# Patient Record
Sex: MALE | Race: WHITE | Employment: OTHER
[De-identification: names, ages, dates, MRNs, and addresses within clinical notes are randomized per-mention and may not be internally consistent; named-entity substitution may affect disease eponyms.]

---

## 2017-02-16 ENCOUNTER — OFFICE VISIT (OUTPATIENT)
Dept: INTERNAL MEDICINE | Facility: CLINIC | Age: 82
End: 2017-02-16

## 2017-02-16 VITALS
BODY MASS INDEX: 27.43 KG/M2 | WEIGHT: 181 LBS | OXYGEN SATURATION: 96 % | SYSTOLIC BLOOD PRESSURE: 112 MMHG | DIASTOLIC BLOOD PRESSURE: 70 MMHG | HEIGHT: 68 IN | HEART RATE: 70 BPM

## 2017-02-16 DIAGNOSIS — I10 ESSENTIAL HYPERTENSION: ICD-10-CM

## 2017-02-16 DIAGNOSIS — Z23 NEED FOR PROPHYLACTIC VACCINATION AGAINST STREPTOCOCCUS PNEUMONIAE (PNEUMOCOCCUS) AND INFLUENZA: ICD-10-CM

## 2017-02-16 DIAGNOSIS — I25.10 CORONARY ARTERY DISEASE INVOLVING NATIVE HEART WITHOUT ANGINA PECTORIS, UNSPECIFIED VESSEL OR LESION TYPE: Primary | ICD-10-CM

## 2017-02-16 DIAGNOSIS — Z12.5 ENCOUNTER FOR SCREENING FOR MALIGNANT NEOPLASM OF PROSTATE: ICD-10-CM

## 2017-02-16 DIAGNOSIS — Z23 NEED FOR PROPHYLACTIC VACCINATION AND INOCULATION AGAINST INFLUENZA: ICD-10-CM

## 2017-02-16 DIAGNOSIS — D64.9 ANEMIA, UNSPECIFIED TYPE: ICD-10-CM

## 2017-02-16 DIAGNOSIS — R79.9 ABNORMAL FINDING OF BLOOD CHEMISTRY: ICD-10-CM

## 2017-02-16 DIAGNOSIS — Z00.00 HEALTHCARE MAINTENANCE: ICD-10-CM

## 2017-02-16 PROCEDURE — 1123F ACP DISCUSS/DSCN MKR DOCD: CPT | Performed by: INTERNAL MEDICINE

## 2017-02-16 PROCEDURE — 99203 OFFICE O/P NEW LOW 30 MIN: CPT | Performed by: INTERNAL MEDICINE

## 2017-02-16 PROCEDURE — G0008 ADMIN INFLUENZA VIRUS VAC: HCPCS | Performed by: INTERNAL MEDICINE

## 2017-02-16 PROCEDURE — 90670 PCV13 VACCINE IM: CPT | Performed by: INTERNAL MEDICINE

## 2017-02-16 PROCEDURE — G8598 ASA/ANTIPLAT THER USED: HCPCS | Performed by: INTERNAL MEDICINE

## 2017-02-16 PROCEDURE — 90686 IIV4 VACC NO PRSV 0.5 ML IM: CPT | Performed by: INTERNAL MEDICINE

## 2017-02-16 PROCEDURE — G0009 ADMIN PNEUMOCOCCAL VACCINE: HCPCS | Performed by: INTERNAL MEDICINE

## 2017-02-16 PROCEDURE — G8420 CALC BMI NORM PARAMETERS: HCPCS | Performed by: INTERNAL MEDICINE

## 2017-02-16 PROCEDURE — G8427 DOCREV CUR MEDS BY ELIG CLIN: HCPCS | Performed by: INTERNAL MEDICINE

## 2017-02-16 PROCEDURE — 4040F PNEUMOC VAC/ADMIN/RCVD: CPT | Performed by: INTERNAL MEDICINE

## 2017-02-16 PROCEDURE — 1036F TOBACCO NON-USER: CPT | Performed by: INTERNAL MEDICINE

## 2017-02-16 PROCEDURE — G8484 FLU IMMUNIZE NO ADMIN: HCPCS | Performed by: INTERNAL MEDICINE

## 2017-02-23 DIAGNOSIS — N17.9 AKI (ACUTE KIDNEY INJURY) (HCC): Primary | ICD-10-CM

## 2018-04-09 ENCOUNTER — OFFICE VISIT (OUTPATIENT)
Dept: INTERNAL MEDICINE | Age: 83
End: 2018-04-09
Payer: MEDICARE

## 2018-04-09 VITALS
HEART RATE: 83 BPM | HEIGHT: 68 IN | WEIGHT: 173 LBS | SYSTOLIC BLOOD PRESSURE: 119 MMHG | DIASTOLIC BLOOD PRESSURE: 79 MMHG | BODY MASS INDEX: 26.22 KG/M2 | OXYGEN SATURATION: 97 %

## 2018-04-09 DIAGNOSIS — I20.9 ANGINA PECTORIS (HCC): Primary | ICD-10-CM

## 2018-04-09 DIAGNOSIS — I27.9: ICD-10-CM

## 2018-04-09 DIAGNOSIS — Z86.79 HISTORY OF ANGINA: ICD-10-CM

## 2018-04-09 PROCEDURE — G0439 PPPS, SUBSEQ VISIT: HCPCS | Performed by: INTERNAL MEDICINE

## 2018-04-09 RX ORDER — RAMIPRIL 2.5 MG/1
2.5 CAPSULE ORAL DAILY
Qty: 30 CAPSULE | Refills: 3 | Status: SHIPPED | OUTPATIENT
Start: 2018-04-09 | End: 2019-04-15 | Stop reason: ALTCHOICE

## 2018-04-09 RX ORDER — CARVEDILOL 6.25 MG/1
6.25 TABLET ORAL 2 TIMES DAILY WITH MEALS
Qty: 60 TABLET | Refills: 3 | Status: SHIPPED | OUTPATIENT
Start: 2018-04-09 | End: 2019-04-15 | Stop reason: ALTCHOICE

## 2018-04-09 ASSESSMENT — LIFESTYLE VARIABLES: HOW OFTEN DO YOU HAVE A DRINK CONTAINING ALCOHOL: 0

## 2018-04-09 ASSESSMENT — ANXIETY QUESTIONNAIRES: GAD7 TOTAL SCORE: 0

## 2018-04-09 ASSESSMENT — PATIENT HEALTH QUESTIONNAIRE - PHQ9: SUM OF ALL RESPONSES TO PHQ QUESTIONS 1-9: 0

## 2019-04-15 ENCOUNTER — OFFICE VISIT (OUTPATIENT)
Dept: INTERNAL MEDICINE | Age: 84
End: 2019-04-15
Payer: MEDICARE

## 2019-04-15 VITALS
HEIGHT: 69 IN | WEIGHT: 165.4 LBS | HEART RATE: 69 BPM | SYSTOLIC BLOOD PRESSURE: 135 MMHG | BODY MASS INDEX: 24.5 KG/M2 | DIASTOLIC BLOOD PRESSURE: 70 MMHG

## 2019-04-15 DIAGNOSIS — I20.9 ANGINA PECTORIS (HCC): ICD-10-CM

## 2019-04-15 DIAGNOSIS — I42.8 NON-ISCHEMIC CARDIOMYOPATHY (HCC): ICD-10-CM

## 2019-04-15 PROCEDURE — 99213 OFFICE O/P EST LOW 20 MIN: CPT | Performed by: INTERNAL MEDICINE

## 2019-04-15 PROCEDURE — G8510 SCR DEP NEG, NO PLAN REQD: HCPCS | Performed by: INTERNAL MEDICINE

## 2019-04-15 PROCEDURE — 4040F PNEUMOC VAC/ADMIN/RCVD: CPT | Performed by: INTERNAL MEDICINE

## 2019-04-15 PROCEDURE — 99211 OFF/OP EST MAY X REQ PHY/QHP: CPT | Performed by: INTERNAL MEDICINE

## 2019-04-15 PROCEDURE — G8420 CALC BMI NORM PARAMETERS: HCPCS | Performed by: INTERNAL MEDICINE

## 2019-04-15 PROCEDURE — G8599 NO ASA/ANTIPLAT THER USE RNG: HCPCS | Performed by: INTERNAL MEDICINE

## 2019-04-15 PROCEDURE — G8427 DOCREV CUR MEDS BY ELIG CLIN: HCPCS | Performed by: INTERNAL MEDICINE

## 2019-04-15 PROCEDURE — 1036F TOBACCO NON-USER: CPT | Performed by: INTERNAL MEDICINE

## 2019-04-15 PROCEDURE — 1123F ACP DISCUSS/DSCN MKR DOCD: CPT | Performed by: INTERNAL MEDICINE

## 2019-04-15 PROCEDURE — 3288F FALL RISK ASSESSMENT DOCD: CPT | Performed by: INTERNAL MEDICINE

## 2019-04-15 ASSESSMENT — ENCOUNTER SYMPTOMS
GASTROINTESTINAL NEGATIVE: 1
RESPIRATORY NEGATIVE: 1
EYES NEGATIVE: 1

## 2019-04-15 ASSESSMENT — PATIENT HEALTH QUESTIONNAIRE - PHQ9
SUM OF ALL RESPONSES TO PHQ QUESTIONS 1-9: 1
SUM OF ALL RESPONSES TO PHQ QUESTIONS 1-9: 1
2. FEELING DOWN, DEPRESSED OR HOPELESS: 0
SUM OF ALL RESPONSES TO PHQ9 QUESTIONS 1 & 2: 1
1. LITTLE INTEREST OR PLEASURE IN DOING THINGS: 1

## 2019-04-15 NOTE — PATIENT INSTRUCTIONS
Patient to return to clinic 1 year (office will call and schedule an appointment). AVS reviewed and given to pt. It is very important for your care that you keep your appointment. If for some reason you are unable to keep your appointment it is equally important that you call our office at [phone] to cancel your appointment and reschedule. Failure to do so may result in your termination from our practice.   MB

## 2019-04-15 NOTE — PROGRESS NOTES
[facility]   Progress Note      Chronic Disease Visit Information    BP Readings from Last 3 Encounters:   04/15/19 135/70   04/09/18 119/79   02/16/17 112/70          Hemoglobin A1C (%)   Date Value   02/18/2017 5.1     LDL Cholesterol (mg/dL)   Date Value   02/18/2017 57     HDL (mg/dL)   Date Value   02/18/2017 32 (L)     BUN (mg/dL)   Date Value   03/06/2017 12     CREATININE (mg/dL)   Date Value   03/06/2017 1.06     Glucose (mg/dL)   Date Value   03/06/2017 90   12/12/2011 100            Have you changed or started any medications since your last visit including any over-the-counter medicines, vitamins, or herbal medicines? no   Are you having any side effects from any of your medications? -  no  Have you stopped taking any of your medications? Is so, why? -  no    Have you seen any other physician or provider since your last visit? No  Have you had any other diagnostic tests since your last visit? No  Have you been seen in the emergency room and/or had an admission to a hospital since we last saw you? No  Have you had your annual diabetic retinal (eye) exam? No  Have you had your routine dental cleaning in the past 6 months? no    Have you activated your [employer] account? If not, what are your barriers?  Yes     Patient Care Team:  Robinson Thapa MD as PCP - General (Internal Medicine)  Robinson Thapa MD as PCP - [facility] Attributed Provider         Medical History Review  Past Medical, Family, and Social History reviewed and does contribute to the patient presenting condition    Health Maintenance   Topic Date Due    DTaP/Tdap/Td vaccine (1 - Tdap) 12/03/1956    Shingles Vaccine (1 of 2) 12/03/1987    Potassium monitoring  03/06/2018    Creatinine monitoring  03/06/2018    Flu vaccine (Season Ended) 09/01/2019    Pneumococcal 65+ years Vaccine  Completed       Date of patient's visit: 4/15/2019  Patient's Name:  Shawnee Luther                   YOB: 1935        PCP:  William Araujo Pilar Mary MD    Cedrick Salter is a 82 y.o. male who presents for   Chief Complaint   Patient presents with    Annual Exam    Forms     assistant living   [facility] Maintenance     pt declined all vaccination    and follow up of chronic medical problems. Patient Active Problem List   Diagnosis    Recent retinal detachment, partial, with single defect    Macular hole    Hypertension    Arthritis    Psoriasis    Anemia    Positive cardiac stress test    CAD (coronary artery disease)    Non-ischemic cardiomyopathy (HCC)    Angina pectoris (HCC)    History of angina       HISTORY OF PRESENT ILLNESS:    History was obtained from the patient. Here for a routine visit. No acute issues. Dementia has worsened where around the clock care is needed and he will be transferring from home to [facility]. Daughter is POA and in room during visit. He has a living will and code status discussion had with what he had documented in living will, the decision to be [facility] was had. Patient's allergies, medications, past medical, surgical, social and family histories were reviewed and updated as appropriate. ALLERGIES    No Known Allergies      MEDICATIONS:      Current Outpatient Medications   Medication Sig Dispense Refill    ramipril (ALTACE) 2.5 MG capsule Take 1 capsule by mouth daily 30 capsule 3    carvedilol (COREG) 6.25 MG tablet Take 1 tablet by mouth 2 times daily (with meals) 60 tablet 3     No current facility-administered medications for this visit.         Patient Care Team:  Samuel Archer MD as PCP - General (Internal Medicine)  Samuel Archer MD as PCP - S Attributed Provider    PAST MEDICAL AND SURGICAL HISTORY:      Past Medical History:   Diagnosis Date    Arthritis     Hx of blood clots     l arm    Hypertension     off meds now    Psoriasis      Past Surgical History:   Procedure Laterality Date    CARDIAC CATHETERIZATION  03/10/2015    CATARACT REMOVAL WITH IMPLANT Bilateral     EYE SURGERY Left 5/6/14    vitrectomy    VENA CAVA FILTER PLACEMENT      catrachita filter    VITRECTOMY Left 10/14/14       SOCIAL HISTORY      Social History     Tobacco Use    Smoking status: Former Smoker    Smokeless tobacco: Former User    Tobacco comment: smoked pipe and cigars   quit 1 yr ago   Substance Use Topics    Alcohol use: No     Comment: quit     Cain Duarte  reports that he has quit smoking. He has quit using smokeless tobacco.    FAMILY HISTORY:      Family History   Problem Relation Age of Onset    Heart Disease Mother     Cancer Mother     Stroke Father        REVIEW OF SYSTEMS:    Review of Systems   Constitutional: Negative. HENT: Negative. Eyes: Negative. Respiratory: Negative. Cardiovascular: Negative. Gastrointestinal: Negative. Endocrine: Positive for cold intolerance. Genitourinary: Negative. Musculoskeletal: Negative. Skin: Negative. Neurological: Negative. Hematological: Negative. Psychiatric/Behavioral: Positive for confusion.        PHYSICAL EXAM:      Vitals:    04/15/19 1251   BP: 135/70   Pulse: 69     BP Readings from Last 3 Encounters:   04/15/19 135/70   04/09/18 119/79   02/16/17 112/70       Physical Examination: General appearance - normal appearing weight  Mental status - affect appropriate to mood  Chest - clear to auscultation, no wheezes, rales or rhonchi, symmetric air entry, diminished breath sounds noted at right base  Heart - normal rate and regular rhythm  Abdomen - soft, nontender, nondistended, no masses or organomegaly  Back exam - full range of motion, no tenderness, palpable spasm or pain on motion  Neurological - significant dementia, normal speech, no focal findings or movement disorder noted  Musculoskeletal - no joint tenderness, deformity or swelling  Extremities - no pedal edema noted    LABORATORY FINDINGS:    CBC:   Lab Results   Component Value Date    WBC 5.5 02/18/2017    HGB 15.4 02/18/2017     02/18/2017  12/12/2011     BMP:    Lab Results   Component Value Date     03/06/2017    K 4.8 03/06/2017     03/06/2017    CO2 23 03/06/2017    BUN 12 03/06/2017    CREATININE 1.06 03/06/2017    GLUCOSE 90 03/06/2017    GLUCOSE 100 12/12/2011     Hemoglobin A1C:   Lab Results   Component Value Date    LABA1C 5.1 02/18/2017     Microalbumin Urine: No results found for: Elmer Lam  Lipid profile:   Lab Results   Component Value Date    CHOL 128 02/18/2017    TRIG 193 02/18/2017    HDL 32 02/18/2017     Thyroid functions: No results found for: TSH   Hepatic functions:   Lab Results   Component Value Date    ALT 13 02/18/2017    AST 16 02/18/2017    PROT 7.1 02/18/2017    BILITOT 0.72 02/18/2017    BILIDIR 0.29 11/30/2013    LABALBU 4.3 02/18/2017     Urine Analysis: No results found for: [id number] [address]:      Health Maintenance Due   Topic Date Due    DTaP/Tdap/Td vaccine (1 - Tdap) 12/03/1956    Shingles Vaccine (1 of 2) 12/03/1987    Potassium monitoring  03/06/2018    Creatinine monitoring  03/06/2018       ASSESSMENT AND PLAN:       Diagnosis Orders   1. Non-ischemic cardiomyopathy ([address]     2. Angina pectoris ([address]       All stable. On no meds currently. Has some seasonal allergies that he takes OTC claritin for. [address] forms and living assistance forms completed  rtc in 1 year      FOLLOW UP:   1. кЕатерина Leonard received counseling on the following healthy behaviors: nutrition and exercise    2. Reviewed prior labs and health maintenance. 3.  Discussed use, benefit, and side effects of prescribed medications. Barriers to medication compliance addressed. All patient questions answered. Pt voiced understanding. 4.  Continue current medications, diet and exercise. No orders of the defined types were placed in this encounter. Completed Refills               Requested Prescriptions      No prescriptions requested or ordered in this encounter       5.  Patient given educational materials - see patient instructions    6. Was a self-tracking handout given in paper form or via [employer]? Yes  If yes, see orders or list here. No orders of the defined types were placed in this encounter. Return in about 1 year (around 4/15/2020). Patient voiced understanding and agreed to treatment plan. This note is created with the assistance of a speech-recognition program. While intending to generate a document that actually reflects the content of the visit, the document can still have some mistakes which may not have been identified and corrected by editing.     Dr Sadi Moreau MD, [address]  Associate , Department of Internal Medicine  [facility]  Attending [address] [facility], [facility] Susy AndradeScionHealthangel luis [address]                   4/15/2019, 1:18 PM

## 2019-10-21 ENCOUNTER — OFFICE VISIT (OUTPATIENT)
Dept: INTERNAL MEDICINE | Age: 84
End: 2019-10-21
Payer: MEDICARE

## 2019-10-21 VITALS
SYSTOLIC BLOOD PRESSURE: 122 MMHG | BODY MASS INDEX: 21.59 KG/M2 | WEIGHT: 150.8 LBS | DIASTOLIC BLOOD PRESSURE: 76 MMHG | HEIGHT: 70 IN

## 2019-10-21 DIAGNOSIS — Z23 NEEDS FLU SHOT: ICD-10-CM

## 2019-10-21 DIAGNOSIS — Z23 NEED FOR PROPHYLACTIC VACCINATION AND INOCULATION AGAINST VARICELLA: ICD-10-CM

## 2019-10-21 DIAGNOSIS — F03.90 DEMENTIA WITHOUT BEHAVIORAL DISTURBANCE, UNSPECIFIED DEMENTIA TYPE: Primary | ICD-10-CM

## 2019-10-21 DIAGNOSIS — Z23 NEED FOR PROPHYLACTIC VACCINATION AGAINST DIPHTHERIA-TETANUS-PERTUSSIS (DTP): ICD-10-CM

## 2019-10-21 DIAGNOSIS — I10 ESSENTIAL HYPERTENSION: ICD-10-CM

## 2019-10-21 DIAGNOSIS — I25.10 CORONARY ARTERY DISEASE INVOLVING NATIVE HEART WITHOUT ANGINA PECTORIS, UNSPECIFIED VESSEL OR LESION TYPE: ICD-10-CM

## 2019-10-21 PROCEDURE — 99214 OFFICE O/P EST MOD 30 MIN: CPT | Performed by: NURSE PRACTITIONER

## 2019-10-21 PROCEDURE — 4040F PNEUMOC VAC/ADMIN/RCVD: CPT | Performed by: NURSE PRACTITIONER

## 2019-10-21 PROCEDURE — G8420 CALC BMI NORM PARAMETERS: HCPCS | Performed by: NURSE PRACTITIONER

## 2019-10-21 PROCEDURE — 90688 IIV4 VACCINE SPLT 0.5 ML IM: CPT | Performed by: NURSE PRACTITIONER

## 2019-10-21 PROCEDURE — 99211 OFF/OP EST MAY X REQ PHY/QHP: CPT | Performed by: NURSE PRACTITIONER

## 2019-10-21 PROCEDURE — G8599 NO ASA/ANTIPLAT THER USE RNG: HCPCS | Performed by: NURSE PRACTITIONER

## 2019-10-21 PROCEDURE — G8427 DOCREV CUR MEDS BY ELIG CLIN: HCPCS | Performed by: NURSE PRACTITIONER

## 2019-10-21 PROCEDURE — 1036F TOBACCO NON-USER: CPT | Performed by: NURSE PRACTITIONER

## 2019-10-21 PROCEDURE — 1123F ACP DISCUSS/DSCN MKR DOCD: CPT | Performed by: NURSE PRACTITIONER

## 2019-10-21 PROCEDURE — G8482 FLU IMMUNIZE ORDER/ADMIN: HCPCS | Performed by: NURSE PRACTITIONER

## 2019-10-21 RX ORDER — GUAIFENESIN 100 MG/5ML
400 SYRUP ORAL 4 TIMES DAILY PRN
Qty: 1 BOTTLE | Refills: 0 | Status: SHIPPED | OUTPATIENT
Start: 2019-10-21

## 2019-10-21 RX ORDER — ACETAMINOPHEN 500 MG
500 TABLET ORAL 4 TIMES DAILY PRN
Qty: 60 TABLET | Refills: 0 | Status: SHIPPED | OUTPATIENT
Start: 2019-10-21

## 2019-11-12 ASSESSMENT — ENCOUNTER SYMPTOMS
SHORTNESS OF BREATH: 0
BLURRED VISION: 0
ORTHOPNEA: 0
CHEST TIGHTNESS: 0

## 2020-01-17 ENCOUNTER — NURSE TRIAGE (OUTPATIENT)
Dept: OTHER | Facility: CLINIC | Age: 85
End: 2020-01-17

## 2020-01-18 ENCOUNTER — APPOINTMENT (OUTPATIENT)
Dept: CT IMAGING | Age: 85
DRG: 640 | End: 2020-01-18
Payer: MEDICARE

## 2020-01-18 ENCOUNTER — APPOINTMENT (OUTPATIENT)
Dept: GENERAL RADIOLOGY | Age: 85
DRG: 640 | End: 2020-01-18
Payer: MEDICARE

## 2020-01-18 ENCOUNTER — HOSPITAL ENCOUNTER (INPATIENT)
Age: 85
LOS: 3 days | Discharge: HOME | DRG: 640 | End: 2020-01-21
Attending: EMERGENCY MEDICINE | Admitting: INTERNAL MEDICINE
Payer: MEDICARE

## 2020-01-18 PROBLEM — F01.50 VASCULAR DEMENTIA (HCC): Status: ACTIVE | Noted: 2020-01-18

## 2020-01-18 PROBLEM — G93.41 ACUTE METABOLIC ENCEPHALOPATHY: Status: ACTIVE | Noted: 2020-01-18

## 2020-01-18 PROBLEM — R53.1 WEAKNESS: Status: ACTIVE | Noted: 2020-01-18

## 2020-01-18 LAB
-: NORMAL
ABSOLUTE EOS #: 0.04 K/UL (ref 0–0.4)
ABSOLUTE IMMATURE GRANULOCYTE: 0 K/UL (ref 0–0.3)
ABSOLUTE LYMPH #: 0.77 K/UL (ref 1–4.8)
ABSOLUTE MONO #: 0.65 K/UL (ref 0.1–0.8)
ALBUMIN SERPL-MCNC: 4.1 G/DL (ref 3.5–5.2)
ALBUMIN/GLOBULIN RATIO: 1.6 (ref 1–2.5)
ALP BLD-CCNC: 66 U/L (ref 40–129)
ALT SERPL-CCNC: 25 U/L (ref 5–41)
AMMONIA: 42 UMOL/L (ref 16–60)
ANION GAP SERPL CALCULATED.3IONS-SCNC: 12 MMOL/L (ref 9–17)
AST SERPL-CCNC: 31 U/L
BASOPHILS # BLD: 0 % (ref 0–2)
BASOPHILS ABSOLUTE: 0 K/UL (ref 0–0.2)
BILIRUB SERPL-MCNC: 0.63 MG/DL (ref 0.3–1.2)
BILIRUBIN URINE: NEGATIVE
BNP INTERPRETATION: NORMAL
BUN BLDV-MCNC: 21 MG/DL (ref 8–23)
BUN/CREAT BLD: NORMAL (ref 9–20)
CALCIUM SERPL-MCNC: 9.2 MG/DL (ref 8.6–10.4)
CHLORIDE BLD-SCNC: 103 MMOL/L (ref 98–107)
CHP ED QC CHECK: YES
CO2: 28 MMOL/L (ref 20–31)
COLOR: YELLOW
COMMENT UA: ABNORMAL
CREAT SERPL-MCNC: 0.8 MG/DL (ref 0.7–1.2)
DIFFERENTIAL TYPE: ABNORMAL
EOSINOPHILS RELATIVE PERCENT: 1 % (ref 1–4)
GFR AFRICAN AMERICAN: >60 ML/MIN
GFR NON-AFRICAN AMERICAN: >60 ML/MIN
GFR SERPL CREATININE-BSD FRML MDRD: NORMAL ML/MIN/{1.73_M2}
GFR SERPL CREATININE-BSD FRML MDRD: NORMAL ML/MIN/{1.73_M2}
GLUCOSE BLD-MCNC: 86 MG/DL (ref 75–110)
GLUCOSE BLD-MCNC: 87 MG/DL
GLUCOSE BLD-MCNC: 90 MG/DL (ref 70–99)
GLUCOSE URINE: NEGATIVE
HCT VFR BLD CALC: 46.7 % (ref 40.7–50.3)
HEMOGLOBIN: 15.1 G/DL (ref 13–17)
IMMATURE GRANULOCYTES: 0 %
KETONES, URINE: ABNORMAL
LEUKOCYTE ESTERASE, URINE: NEGATIVE
LYMPHOCYTES # BLD: 18 % (ref 24–44)
MCH RBC QN AUTO: 30.7 PG (ref 25.2–33.5)
MCHC RBC AUTO-ENTMCNC: 32.3 G/DL (ref 28.4–34.8)
MCV RBC AUTO: 94.9 FL (ref 82.6–102.9)
MONOCYTES # BLD: 15 % (ref 1–7)
MORPHOLOGY: NORMAL
NITRITE, URINE: NEGATIVE
NRBC AUTOMATED: 0 PER 100 WBC
PDW BLD-RTO: 13.4 % (ref 11.8–14.4)
PH UA: 5 (ref 5–8)
PLATELET # BLD: 173 K/UL (ref 138–453)
PLATELET ESTIMATE: ABNORMAL
PMV BLD AUTO: 9.5 FL (ref 8.1–13.5)
POTASSIUM SERPL-SCNC: 4.3 MMOL/L (ref 3.7–5.3)
PRO-BNP: 193 PG/ML
PROTEIN UA: NEGATIVE
RBC # BLD: 4.92 M/UL (ref 4.21–5.77)
RBC # BLD: ABNORMAL 10*6/UL
REASON FOR REJECTION: NORMAL
SEG NEUTROPHILS: 66 % (ref 36–66)
SEGMENTED NEUTROPHILS ABSOLUTE COUNT: 2.84 K/UL (ref 1.8–7.7)
SODIUM BLD-SCNC: 143 MMOL/L (ref 135–144)
SPECIFIC GRAVITY UA: 1.02 (ref 1–1.03)
TOTAL PROTEIN: 6.6 G/DL (ref 6.4–8.3)
TROPONIN INTERP: ABNORMAL
TROPONIN T: ABNORMAL NG/ML
TROPONIN, HIGH SENSITIVITY: 100 NG/L (ref 0–22)
TROPONIN, HIGH SENSITIVITY: 113 NG/L (ref 0–22)
TROPONIN, HIGH SENSITIVITY: 93 NG/L (ref 0–22)
TROPONIN, HIGH SENSITIVITY: 98 NG/L (ref 0–22)
TSH SERPL DL<=0.05 MIU/L-ACNC: 2.35 MIU/L (ref 0.3–5)
TURBIDITY: CLEAR
URINE HGB: NEGATIVE
UROBILINOGEN, URINE: NORMAL
WBC # BLD: 4.3 K/UL (ref 3.5–11.3)
WBC # BLD: ABNORMAL 10*3/UL
ZZ NTE CLEAN UP: ORDERED TEST: NORMAL
ZZ NTE WITH NAME CLEAN UP: SPECIMEN SOURCE: NORMAL

## 2020-01-18 PROCEDURE — 36415 COLL VENOUS BLD VENIPUNCTURE: CPT

## 2020-01-18 PROCEDURE — 83880 ASSAY OF NATRIURETIC PEPTIDE: CPT

## 2020-01-18 PROCEDURE — 2580000003 HC RX 258: Performed by: STUDENT IN AN ORGANIZED HEALTH CARE EDUCATION/TRAINING PROGRAM

## 2020-01-18 PROCEDURE — 82607 VITAMIN B-12: CPT

## 2020-01-18 PROCEDURE — 80053 COMPREHEN METABOLIC PANEL: CPT

## 2020-01-18 PROCEDURE — 6370000000 HC RX 637 (ALT 250 FOR IP): Performed by: STUDENT IN AN ORGANIZED HEALTH CARE EDUCATION/TRAINING PROGRAM

## 2020-01-18 PROCEDURE — 82306 VITAMIN D 25 HYDROXY: CPT

## 2020-01-18 PROCEDURE — 85025 COMPLETE CBC W/AUTO DIFF WBC: CPT

## 2020-01-18 PROCEDURE — 71045 X-RAY EXAM CHEST 1 VIEW: CPT

## 2020-01-18 PROCEDURE — 84425 ASSAY OF VITAMIN B-1: CPT

## 2020-01-18 PROCEDURE — 73030 X-RAY EXAM OF SHOULDER: CPT

## 2020-01-18 PROCEDURE — 1200000000 HC SEMI PRIVATE

## 2020-01-18 PROCEDURE — 82746 ASSAY OF FOLIC ACID SERUM: CPT

## 2020-01-18 PROCEDURE — 84443 ASSAY THYROID STIM HORMONE: CPT

## 2020-01-18 PROCEDURE — 6360000002 HC RX W HCPCS: Performed by: STUDENT IN AN ORGANIZED HEALTH CARE EDUCATION/TRAINING PROGRAM

## 2020-01-18 PROCEDURE — 80076 HEPATIC FUNCTION PANEL: CPT

## 2020-01-18 PROCEDURE — 82140 ASSAY OF AMMONIA: CPT

## 2020-01-18 PROCEDURE — 81003 URINALYSIS AUTO W/O SCOPE: CPT

## 2020-01-18 PROCEDURE — 84484 ASSAY OF TROPONIN QUANT: CPT

## 2020-01-18 PROCEDURE — 70450 CT HEAD/BRAIN W/O DYE: CPT

## 2020-01-18 PROCEDURE — 80307 DRUG TEST PRSMV CHEM ANLYZR: CPT

## 2020-01-18 PROCEDURE — 93005 ELECTROCARDIOGRAM TRACING: CPT | Performed by: STUDENT IN AN ORGANIZED HEALTH CARE EDUCATION/TRAINING PROGRAM

## 2020-01-18 PROCEDURE — 82947 ASSAY GLUCOSE BLOOD QUANT: CPT

## 2020-01-18 PROCEDURE — 99285 EMERGENCY DEPT VISIT HI MDM: CPT

## 2020-01-18 RX ORDER — SODIUM CHLORIDE 0.9 % (FLUSH) 0.9 %
10 SYRINGE (ML) INJECTION PRN
Status: DISCONTINUED | OUTPATIENT
Start: 2020-01-18 | End: 2020-01-21 | Stop reason: HOSPADM

## 2020-01-18 RX ORDER — SODIUM CHLORIDE 0.9 % (FLUSH) 0.9 %
10 SYRINGE (ML) INJECTION EVERY 12 HOURS SCHEDULED
Status: DISCONTINUED | OUTPATIENT
Start: 2020-01-18 | End: 2020-01-21 | Stop reason: HOSPADM

## 2020-01-18 RX ORDER — SODIUM CHLORIDE 9 MG/ML
INJECTION, SOLUTION INTRAVENOUS CONTINUOUS
Status: DISCONTINUED | OUTPATIENT
Start: 2020-01-18 | End: 2020-01-18

## 2020-01-18 RX ORDER — ASPIRIN 81 MG/1
324 TABLET, CHEWABLE ORAL ONCE
Status: COMPLETED | OUTPATIENT
Start: 2020-01-18 | End: 2020-01-18

## 2020-01-18 RX ORDER — DEXTROSE AND SODIUM CHLORIDE 5; .9 G/100ML; G/100ML
INJECTION, SOLUTION INTRAVENOUS CONTINUOUS
Status: DISCONTINUED | OUTPATIENT
Start: 2020-01-18 | End: 2020-01-19

## 2020-01-18 RX ORDER — ACETAMINOPHEN 325 MG/1
650 TABLET ORAL EVERY 4 HOURS PRN
Status: DISCONTINUED | OUTPATIENT
Start: 2020-01-18 | End: 2020-01-21 | Stop reason: HOSPADM

## 2020-01-18 RX ORDER — ONDANSETRON 2 MG/ML
4 INJECTION INTRAMUSCULAR; INTRAVENOUS EVERY 6 HOURS PRN
Status: DISCONTINUED | OUTPATIENT
Start: 2020-01-18 | End: 2020-01-21 | Stop reason: HOSPADM

## 2020-01-18 RX ORDER — DIPHENHYDRAMINE HYDROCHLORIDE 50 MG/ML
25 INJECTION INTRAMUSCULAR; INTRAVENOUS ONCE
Status: COMPLETED | OUTPATIENT
Start: 2020-01-18 | End: 2020-01-18

## 2020-01-18 RX ADMIN — DEXTROSE AND SODIUM CHLORIDE: 5; 900 INJECTION, SOLUTION INTRAVENOUS at 20:57

## 2020-01-18 RX ADMIN — ASPIRIN 324 MG: 81 TABLET ORAL at 15:40

## 2020-01-18 RX ADMIN — DIPHENHYDRAMINE HYDROCHLORIDE 25 MG: 50 INJECTION, SOLUTION INTRAMUSCULAR; INTRAVENOUS at 22:24

## 2020-01-18 RX ADMIN — ENOXAPARIN SODIUM 40 MG: 40 INJECTION SUBCUTANEOUS at 20:57

## 2020-01-18 ASSESSMENT — PAIN SCALES - GENERAL: PAINLEVEL_OUTOF10: 0

## 2020-01-18 ASSESSMENT — ENCOUNTER SYMPTOMS
BACK PAIN: 0
EYE REDNESS: 0
NAUSEA: 0
WHEEZING: 0
EYE DISCHARGE: 0
SHORTNESS OF BREATH: 0
BLOOD IN STOOL: 0
FACIAL SWELLING: 0
EYE ITCHING: 0
CONSTIPATION: 0
ABDOMINAL PAIN: 0
DIARRHEA: 0
COLOR CHANGE: 0
APNEA: 0
RHINORRHEA: 0
CHEST TIGHTNESS: 0
COUGH: 0
VOMITING: 0
ABDOMINAL DISTENTION: 0

## 2020-01-18 NOTE — ED NOTES
Pt to ED via EMS from assisted living facility with c/o possible facial droop per aid at facility, leaning to the left and decrease in ability to walk and do his ADLs. Pt has hx of dementia, normally can do all of his ADLs on his own, gets around without any problems. Today the patient couldn't get up on his own, still leaning to the left. The patient's speech is clear, he is alert and disoriented which is his baseline. Pt placed on full cardiac monitor, placed in gown for exam.  Dr Jose Hodges at bedside as well as the patient's daughter and son-in-law.      Suri Clements, RN  01/18/20 5667

## 2020-01-18 NOTE — ED PROVIDER NOTES
[facility] ED  Emergency Department Encounter  Emergency Medicine Resident     Pt Name: Marylen Font  MRN: [id number]  [place] 1935  Date of evaluation: 1/18/20  PCP:  Michele Schrader MD    [address] [place] [address]       Chief Complaint   Patient presents with    Fatigue     Pt to ED room 15 with c/o weakness per daughter past 3 days. Daughter told EMS he had a facial droop this am. EMS denies any neuro deficits. HISTORY OFPRESENT ILLNESS  (Location/Symptom, Timing/Onset, Context/Setting, Quality, Duration, Modifying Factors,Severity.)      Marylen Font is a 82 y.o. male who presents with complaints of generalized fatigue and weakness per daughter. Patient with past medical history of hypertension, CAD arthritis patient lives at a facility, where he can perform all activities of daily living at his baseline. Daughter states that patient usually is up walking around, interactive and that he has been laying around, more fatigued than normal.  There was a report that he was found at one point slid down on the wall, he was evaluated by the staff there there was no signs of trauma, is not on blood thinners and therefore was not evaluated in the ED. Patient denies any chest pain, shortness of breath, abdominal pain. Daughter notes that they noticed that patient's bilateral legs were slightly more swollen than normal with no signs of redness, warmth or fever. Patient has continued to eat and drink, have normal bowel movement. Patient denies headache, vision changes, neck pain, back pain. Patient denies falls, hitting his head or loss of consciousness although patient does have a history of dementia therefore making history limited. PAST MEDICAL / SURGICAL / SOCIAL / FAMILY HISTORY      has a past medical history of Alzheimer disease ([address] Arthritis, Dementia ([address] Hx of blood clots, Hypertension, and Psoriasis.      has a past surgical history that includes eye surgery (Left, 5/6/14); Vena Cava Filter Placement; Cataract removal with implant (Bilateral); vitrectomy (Left, 10/14/14); and Cardiac catheterization (03/10/2015). Social History     Socioeconomic History    Marital status:      Spouse name: Not on file    Number of children: Not on file    Years of education: Not on file    Highest education level: Not on file   Occupational History    Not on file   Social Needs    Financial resource strain: Not on file    Food insecurity:     Worry: Not on file     Inability: Not on file    Transportation needs:     Medical: Not on file     Non-medical: Not on file   Tobacco Use    Smoking status: Former Smoker    Smokeless tobacco: Former User    Tobacco comment: smoked pipe and cigars   quit 1 yr ago   Substance and Sexual Activity    Alcohol use: No     Comment: quit    Drug use: No    Sexual activity: Not on file   Lifestyle    Physical activity:     Days per week: Not on file     Minutes per session: Not on file    Stress: Not on file   Relationships    Social connections:     Talks on phone: Not on file     Gets together: Not on file     Attends [employer] service: Not on file     Active member of club or organization: Not on file     Attends meetings of clubs or organizations: Not on file     Relationship status: Not on file    Intimate partner violence:     Fear of current or ex partner: Not on file     Emotionally abused: Not on file     Physically abused: Not on file     Forced sexual activity: Not on file   Other Topics Concern    Not on file   Social History Narrative    Not on file       Family History   Problem Relation Age of Onset    Heart Disease Mother     Cancer Mother     Stroke Father         Allergies:  Patient has no known allergies. Home Medications:  Prior to Admission medications    Medication Sig Start Date End Date Taking?  Authorizing Provider   acetaminophen (TYLENOL) 500 MG tablet Take 1 tablet by mouth 4 times daily as needed for Pain or Fever 10/21/19  Yes JENNA Rodriguez CNP   guaiFENesin (ALTARUSSIN) 100 MG/5ML syrup Take 20 mLs by mouth 4 times daily as needed for Cough 10/21/19   JENNA Rodriguez CNP       REVIEW OFSYSTEMS    (2-9 systems for level 4, 10 or more for level 5)      Review of Systems   Constitutional: Positive for fatigue. Negative for activity change, appetite change, chills and fever. Eyes: Negative for visual disturbance. Respiratory: Negative for cough, shortness of breath and wheezing. Cardiovascular: Positive for leg swelling. Negative for chest pain and palpitations. Gastrointestinal: Negative for abdominal pain, constipation, diarrhea, nausea and vomiting. Genitourinary: Negative for dysuria, frequency and hematuria. Musculoskeletal: Negative for back pain and neck pain. Skin: Negative for rash and wound. Neurological: Positive for weakness (Generalized). Negative for dizziness, syncope, light-headedness and headaches. PHYSICAL EXAM   (up to 7 for level 4, 8 or more forlevel 5)      INITIAL VITALS:   ED Triage Vitals [01/18/20 1044]   BP Temp Temp src Pulse Resp SpO2 Height Weight   (!) 152/89 98.2 °F (36.8 °C) -- 74 18 97 % -- --       Physical Exam  Vitals signs and nursing note reviewed. Constitutional:       General: He is not in acute distress. Appearance: Normal appearance. He is well-developed. He is not diaphoretic. HENT:      Head: Normocephalic and atraumatic. Eyes:      Extraocular Movements: Extraocular movements intact. Pupils: Pupils are equal, round, and reactive to light. Neck:      Musculoskeletal: Normal range of motion and neck supple. Cardiovascular:      Rate and Rhythm: Normal rate and regular rhythm. Heart sounds: Normal heart sounds. Pulmonary:      Effort: Pulmonary effort is normal. No respiratory distress. Breath sounds: Normal breath sounds. Abdominal:      General: There is no distension.       Palpations: Abdomen is soft. Tenderness: There is no tenderness. There is no guarding. Musculoskeletal: Normal range of motion. Left shoulder: He exhibits bony tenderness. He exhibits no swelling, no effusion, no crepitus, normal pulse and normal strength. Right lower leg: Edema present. Left lower leg: Edema present. Comments: No midline cervical, thoracic, lumbar tenderness, no step-offs or deformities, pelvis stable   Skin:     General: Skin is warm and dry. Neurological:      Mental Status: He is alert and oriented to person, place, and time. Cranial Nerves: No cranial nerve deficit. Sensory: No sensory deficit. Motor: No weakness. Comments: NIH 0, of note when patient sleeping he leans to left, although when awake he sits upright w/ no neurologic deficit, no facial droop   Psychiatric:         Behavior: Behavior normal.         Thought Content:  Thought content normal.         DIFFERENTIAL  DIAGNOSIS     PLAN (LABS / IMAGING / EKG):  Orders Placed This Encounter   Procedures    CT HEAD WO CONTRAST    XR SHOULDER LEFT (MIN 2 VIEWS)    XR CHEST PORTABLE    MRI Brain WO Contrast    MRI CERVICAL SPINE WO CONTRAST    MRI LUMBAR SPINE WO CONTRAST    CT CERVICAL SPINE WO CONTRAST    CT LUMBAR SPINE WO CONTRAST    CBC WITH AUTO DIFFERENTIAL    Comprehensive Metabolic Panel    Urinalysis Reflex to Culture    Troponin    Brain Natriuretic Peptide    TSH with Reflex    SPECIMEN REJECTION    PREVIOUS SPECIMEN    Troponin    Basic Metabolic Panel w/ Reflex to MG    CBC auto differential    Troponin    AMMONIA    HEPATIC FUNCTION PANEL    VITAMIN B12 & FOLATE    Vitamin B1    Vitamin D 25 Hydroxy    Troponin    Urine Drug Screen    LAB SCANNED REPORT    DIET GENERAL; Dysphagia Soft and Bite-Sized    Vital signs per unit routine    Notify physician    Notify physician    Up with assistance    Place intermittent pneumatic compression device    Telemetry MDM/Plan/ED COURSE:    82 y.o. male who presents with complaints of generalized fatigue. On exam patient is well-appearing, nontoxic. Vital signs are within normal limits. On physical exam abdomen is soft, nontender, nondistended. Lungs are clear to auscultation bilaterally. Cardiac regular rate and rhythm. Patient does have tenderness palpation of the left shoulder but is neurovascular intact with 2+ radial pulse, no obvious external signs of trauma. No midline cervical, thoracic, lumbar tenderness. Cranial nerves II through XII intact, 5 out of 5 strength in bilateral upper and lower extremities, sensation intact in bilateral upper and lower extremities. Patient with 1+ pitting edema bilaterally with no signs of cellulitis including erythema, warmth. No calf tenderness. We will plan for cardiac labs, BNP, TSH, urinalysis, CT head, chest x-ray and left shoulder x-ray. Anticipate admission even if labs are unremarkable as patient is clearly not at his baseline and unable to perform ADLs. Of note patients daughter w/ paperwork at bedside states patient is [address] - without intubation. ED Course as of Jan 20 1555   Sat Jan 18, 2020   1144 Chest x-ray and left shoulder x-ray unremarkable. [LW]   1149 Troponin, High Sensitivity(!!): 113 [LW]   1149 We will plan to repeat and discuss with cardiology. [LW]   1150 CT head negative. [LW]   [phone] Updated patient and family, agreeable for admission. Will re-page cardiology as the previous call was missed due to being in another patient's room. We will also page internal medicine for admission. [LW]   [phone] with cardiology, no need to start heparin at this time, recommending trend troponins. [LW]   [phone] with internal medicine who is agreeable for admission. [LW]   1623 Urinalysis normal.    [LW]      ED Course User Index  [LW] Florian Baron, DO     Repeat troponin 93. No leukocytosis, hemoglobin stable.   No electrolyte TROPONIN - Abnormal; Notable for the following components:    Troponin, High Sensitivity 98 (*)     All other components within normal limits   BASIC METABOLIC PANEL W/ REFLEX TO MG FOR LOW K - Abnormal; Notable for the following components:    Glucose 105 (*)     CREATININE 0.65 (*)     Calcium 8.1 (*)     Anion Gap 8 (*)     All other components within normal limits   CBC WITH AUTO DIFFERENTIAL - Abnormal; Notable for the following components:    Seg Neutrophils 66 (*)     Lymphocytes 19 (*)     Monocytes 13 (*)     Absolute Lymph # 0.90 (*)     All other components within normal limits   POCT GLUCOSE - Normal   COMPREHENSIVE METABOLIC PANEL   BRAIN NATRIURETIC PEPTIDE   TSH WITH REFLEX   SPECIMEN REJECTION   AMMONIA   VITAMIN B12 & FOLATE   URINE DRUG SCREEN   PREVIOUS SPECIMEN   VITAMIN B1   POC GLUCOSE FINGERSTICK           Mri Brain Wo Contrast    Result Date: 1/19/2020  EXAMINATION: MRI OF THE BRAIN WITHOUT CONTRAST  1/19/2020 4:09 pm TECHNIQUE: Multiplanar multisequence MRI of the brain was performed without the administration of intravenous contrast. COMPARISON: CT brain 01/18/2020 HISTORY: ORDERING SYSTEM PROVIDED HISTORY: dementia TECHNOLOGIST PROVIDED HISTORY: dementia FINDINGS: INTRACRANIAL STRUCTURES/VENTRICLES: There are no areas of restricted diffusion identified to suggest an acute infarct. There is no acute intracranial hemorrhage. No mass effect or midline shift is present. There is severe diffuse cerebral volume loss. There are multiple foci of abnormal increased T2/FLAIR signal intensity within the periventricular, subcortical and deep white matter of both hemispheres. Multiple punctate foci of blooming are noted on gradient imaging throughout the cerebellum and cerebral hemispheres consistent with multiple remote micro hemorrhages. There is no abnormal extra-axial fluid collection. There is no sellar or suprasellar mass present.   The proximal portions of the [place] demonstrate

## 2020-01-18 NOTE — ED PROVIDER NOTES
3:22 PM  Received patient in Sign-out from Dr. Jace Han, please see their HPI for full history and physical.    Briefly, this patient is a 82 y.o. male who presented with weakness, fatigue, inability to do ADLs.  Troponin elevated, pending bed assignment      ---End Sign-out---           Maralee Lennox, MD  01/18/20 9193

## 2020-01-18 NOTE — ED NOTES
Pts sheets changed and given new blankets, pt clothing given to pts daughter. Pts daughter states the pt is not normally incontinent.      Rosanna Reeves RN  01/18/20 5249
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
## 2020-01-19 ENCOUNTER — APPOINTMENT (OUTPATIENT)
Dept: MRI IMAGING | Age: 85
DRG: 640 | End: 2020-01-19
Payer: MEDICARE

## 2020-01-19 PROBLEM — R53.83 FATIGUE: Status: ACTIVE | Noted: 2020-01-18

## 2020-01-19 LAB
ABSOLUTE EOS #: 0.05 K/UL (ref 0–0.44)
ABSOLUTE IMMATURE GRANULOCYTE: <0.03 K/UL (ref 0–0.3)
ABSOLUTE LYMPH #: 0.95 K/UL (ref 1.1–3.7)
ABSOLUTE MONO #: 0.54 K/UL (ref 0.1–1.2)
ALBUMIN SERPL-MCNC: 3.4 G/DL (ref 3.5–5.2)
ALBUMIN/GLOBULIN RATIO: 1.5 (ref 1–2.5)
ALP BLD-CCNC: 70 U/L (ref 40–129)
ALT SERPL-CCNC: 25 U/L (ref 5–41)
AMPHETAMINE SCREEN URINE: NEGATIVE
ANION GAP SERPL CALCULATED.3IONS-SCNC: 10 MMOL/L (ref 9–17)
AST SERPL-CCNC: 37 U/L
BARBITURATE SCREEN URINE: NEGATIVE
BASOPHILS # BLD: 0 % (ref 0–2)
BASOPHILS ABSOLUTE: <0.03 K/UL (ref 0–0.2)
BENZODIAZEPINE SCREEN, URINE: NEGATIVE
BILIRUB SERPL-MCNC: 0.35 MG/DL (ref 0.3–1.2)
BILIRUBIN DIRECT: 0.12 MG/DL
BILIRUBIN, INDIRECT: 0.23 MG/DL (ref 0–1)
BUN BLDV-MCNC: 14 MG/DL (ref 8–23)
BUN/CREAT BLD: ABNORMAL (ref 9–20)
BUPRENORPHINE URINE: NORMAL
CALCIUM SERPL-MCNC: 8.2 MG/DL (ref 8.6–10.4)
CANNABINOID SCREEN URINE: NEGATIVE
CHLORIDE BLD-SCNC: 109 MMOL/L (ref 98–107)
CO2: 25 MMOL/L (ref 20–31)
COCAINE METABOLITE, URINE: NEGATIVE
CREAT SERPL-MCNC: 0.63 MG/DL (ref 0.7–1.2)
DIFFERENTIAL TYPE: ABNORMAL
EOSINOPHILS RELATIVE PERCENT: 1 % (ref 1–4)
FOLATE: 16.5 NG/ML
GFR AFRICAN AMERICAN: >60 ML/MIN
GFR NON-AFRICAN AMERICAN: >60 ML/MIN
GFR SERPL CREATININE-BSD FRML MDRD: ABNORMAL ML/MIN/{1.73_M2}
GFR SERPL CREATININE-BSD FRML MDRD: ABNORMAL ML/MIN/{1.73_M2}
GLOBULIN: ABNORMAL G/DL (ref 1.5–3.8)
GLUCOSE BLD-MCNC: 92 MG/DL (ref 70–99)
HCT VFR BLD CALC: 44.2 % (ref 40.7–50.3)
HEMOGLOBIN: 13.9 G/DL (ref 13–17)
IMMATURE GRANULOCYTES: 0 %
LYMPHOCYTES # BLD: 25 % (ref 24–43)
MCH RBC QN AUTO: 30.4 PG (ref 25.2–33.5)
MCHC RBC AUTO-ENTMCNC: 31.4 G/DL (ref 28.4–34.8)
MCV RBC AUTO: 96.7 FL (ref 82.6–102.9)
MDMA URINE: NORMAL
METHADONE SCREEN, URINE: NEGATIVE
METHAMPHETAMINE, URINE: NORMAL
MONOCYTES # BLD: 14 % (ref 3–12)
NRBC AUTOMATED: 0 PER 100 WBC
OPIATES, URINE: NEGATIVE
OXYCODONE SCREEN URINE: NEGATIVE
PDW BLD-RTO: 13.3 % (ref 11.8–14.4)
PHENCYCLIDINE, URINE: NEGATIVE
PLATELET # BLD: 151 K/UL (ref 138–453)
PLATELET ESTIMATE: ABNORMAL
PMV BLD AUTO: 9.2 FL (ref 8.1–13.5)
POTASSIUM SERPL-SCNC: 3.8 MMOL/L (ref 3.7–5.3)
PROPOXYPHENE, URINE: NORMAL
RBC # BLD: 4.57 M/UL (ref 4.21–5.77)
RBC # BLD: ABNORMAL 10*6/UL
SEG NEUTROPHILS: 60 % (ref 36–65)
SEGMENTED NEUTROPHILS ABSOLUTE COUNT: 2.21 K/UL (ref 1.5–8.1)
SODIUM BLD-SCNC: 144 MMOL/L (ref 135–144)
TEST INFORMATION: NORMAL
TOTAL PROTEIN: 5.7 G/DL (ref 6.4–8.3)
TRICYCLIC ANTIDEPRESSANTS, UR: NORMAL
TROPONIN INTERP: ABNORMAL
TROPONIN T: ABNORMAL NG/ML
TROPONIN, HIGH SENSITIVITY: 88 NG/L (ref 0–22)
VITAMIN B-12: 611 PG/ML (ref 232–1245)
VITAMIN D 25-HYDROXY: 18.9 NG/ML (ref 30–100)
WBC # BLD: 3.8 K/UL (ref 3.5–11.3)
WBC # BLD: ABNORMAL 10*3/UL

## 2020-01-19 PROCEDURE — 6370000000 HC RX 637 (ALT 250 FOR IP): Performed by: STUDENT IN AN ORGANIZED HEALTH CARE EDUCATION/TRAINING PROGRAM

## 2020-01-19 PROCEDURE — 70551 MRI BRAIN STEM W/O DYE: CPT

## 2020-01-19 PROCEDURE — 2580000003 HC RX 258: Performed by: STUDENT IN AN ORGANIZED HEALTH CARE EDUCATION/TRAINING PROGRAM

## 2020-01-19 PROCEDURE — 97530 THERAPEUTIC ACTIVITIES: CPT

## 2020-01-19 PROCEDURE — 80048 BASIC METABOLIC PNL TOTAL CA: CPT

## 2020-01-19 PROCEDURE — 99222 1ST HOSP IP/OBS MODERATE 55: CPT | Performed by: PSYCHIATRY & NEUROLOGY

## 2020-01-19 PROCEDURE — 36415 COLL VENOUS BLD VENIPUNCTURE: CPT

## 2020-01-19 PROCEDURE — 1200000000 HC SEMI PRIVATE

## 2020-01-19 PROCEDURE — 97161 PT EVAL LOW COMPLEX 20 MIN: CPT

## 2020-01-19 PROCEDURE — 99233 SBSQ HOSP IP/OBS HIGH 50: CPT | Performed by: INTERNAL MEDICINE

## 2020-01-19 PROCEDURE — 85025 COMPLETE CBC W/AUTO DIFF WBC: CPT

## 2020-01-19 PROCEDURE — 6360000002 HC RX W HCPCS: Performed by: STUDENT IN AN ORGANIZED HEALTH CARE EDUCATION/TRAINING PROGRAM

## 2020-01-19 PROCEDURE — 2500000003 HC RX 250 WO HCPCS: Performed by: STUDENT IN AN ORGANIZED HEALTH CARE EDUCATION/TRAINING PROGRAM

## 2020-01-19 PROCEDURE — 84484 ASSAY OF TROPONIN QUANT: CPT

## 2020-01-19 RX ORDER — QUETIAPINE FUMARATE 25 MG/1
25 TABLET, FILM COATED ORAL ONCE
Status: COMPLETED | OUTPATIENT
Start: 2020-01-19 | End: 2020-01-19

## 2020-01-19 RX ORDER — OLANZAPINE 10 MG/1
2.5 INJECTION, POWDER, LYOPHILIZED, FOR SOLUTION INTRAMUSCULAR ONCE
Status: COMPLETED | OUTPATIENT
Start: 2020-01-19 | End: 2020-01-19

## 2020-01-19 RX ORDER — DIPHENHYDRAMINE HYDROCHLORIDE 50 MG/ML
25 INJECTION INTRAMUSCULAR; INTRAVENOUS ONCE
Status: COMPLETED | OUTPATIENT
Start: 2020-01-20 | End: 2020-01-19

## 2020-01-19 RX ORDER — ASPIRIN 81 MG/1
81 TABLET ORAL DAILY
Status: DISCONTINUED | OUTPATIENT
Start: 2020-01-19 | End: 2020-01-21 | Stop reason: HOSPADM

## 2020-01-19 RX ORDER — DEXTROSE AND SODIUM CHLORIDE 5; .45 G/100ML; G/100ML
INJECTION, SOLUTION INTRAVENOUS CONTINUOUS
Status: DISCONTINUED | OUTPATIENT
Start: 2020-01-19 | End: 2020-01-20

## 2020-01-19 RX ORDER — ATORVASTATIN CALCIUM 40 MG/1
40 TABLET, FILM COATED ORAL NIGHTLY
Status: DISCONTINUED | OUTPATIENT
Start: 2020-01-19 | End: 2020-01-20

## 2020-01-19 RX ADMIN — Medication 10 ML: at 09:08

## 2020-01-19 RX ADMIN — QUETIAPINE FUMARATE 25 MG: 25 TABLET, FILM COATED ORAL at 01:30

## 2020-01-19 RX ADMIN — WATER 2.1 ML: 1 INJECTION INTRAMUSCULAR; INTRAVENOUS; SUBCUTANEOUS at 17:30

## 2020-01-19 RX ADMIN — DEXTROSE AND SODIUM CHLORIDE: 5; 450 INJECTION, SOLUTION INTRAVENOUS at 09:08

## 2020-01-19 RX ADMIN — ATORVASTATIN CALCIUM 40 MG: 80 TABLET, FILM COATED ORAL at 21:23

## 2020-01-19 RX ADMIN — DIPHENHYDRAMINE HYDROCHLORIDE 25 MG: 50 INJECTION, SOLUTION INTRAMUSCULAR; INTRAVENOUS at 23:37

## 2020-01-19 RX ADMIN — Medication 81 MG: at 12:57

## 2020-01-19 RX ADMIN — OLANZAPINE 2.5 MG: 10 INJECTION, POWDER, LYOPHILIZED, FOR SOLUTION INTRAMUSCULAR at 17:09

## 2020-01-19 RX ADMIN — ENOXAPARIN SODIUM 40 MG: 40 INJECTION SUBCUTANEOUS at 09:08

## 2020-01-19 RX ADMIN — QUETIAPINE FUMARATE 25 MG: 25 TABLET ORAL at 23:27

## 2020-01-19 NOTE — PROGRESS NOTES
Physical Therapy    Facility/Department: [facility]  Initial Assessment    NAME: Dilma Matos  : 1935  MRN: [id number]    Date of Service: 2020     Chief complaint: Generalized weakness and fatigue    Discharge Recommendations:  ECF with PT, 24 hour supervision or assist        Assessment   Body structures, Functions, Activity limitations: Decreased functional mobility ; Decreased cognition;Decreased balance;Decreased safe awareness  Assessment: Patient function most limited by mental status. Patient stood EOB with CGA and RW with fair balance. Patient is impulsive and lacks safety awareness. Further therapy recommended at discharge. Currently needs 24/7 supervision and assistance. Prognosis: Fair  Decision Making: Low Complexity  Barriers to Learning: Mental status, dementia  REQUIRES PT FOLLOW UP: Yes  Activity Tolerance  Activity Tolerance: Patient limited by cognitive status       Patient Diagnosis(es): The primary encounter diagnosis was Fatigue, unspecified type. A diagnosis of Elevated troponin was also pertinent to this visit. has a past medical history of Alzheimer disease ([facility] [address] Arthritis, Dementia ([address] Hx of blood clots, Hypertension, and Psoriasis. has a past surgical history that includes eye surgery (Left, 14); Vena Cava Filter Placement; Cataract removal with implant (Bilateral); vitrectomy (Left, 10/14/14); and Cardiac catheterization (03/10/2015).     Restrictions  Restrictions/Precautions  Restrictions/Precautions: Fall Risk  Vision/Hearing  Vision: Within Functional Limits  Hearing: Within functional limits     Subjective  General  Chart Reviewed: Yes  Follows Commands: Impaired(Impulsive, patient was attempting to get out of bed when writer entered room. )  Pain Screening  Patient Currently in Pain: No  Vital Signs  Patient Currently in Pain: No       Orientation  Orientation  Overall Orientation Status: Impaired  Orientation Level: Oriented to person  Social/Functional History  Social/Functional History  Type of Home: Facility  Additional Comments: Poor historian, per medical record daughter states he can complete daily bathing and dressing and ambulate independently. Requires assistance with finances and cooking. Cognition   Cognition  Overall Cognitive Status: Exceptions  Arousal/Alertness: Appropriate responses to stimuli  Following Commands: Inconsistently follows commands  Attention Span: Difficulty attending to directions  Safety Judgement: Decreased awareness of need for safety    Objective     Observation/Palpation  Posture: Fair    AROM RLE (degrees)  RLE AROM: WFL  AROM LLE (degrees)  LLE AROM : WFL  AROM RUE (degrees)  RUE AROM : WFL  AROM LUE (degrees)  LUE AROM : WFL  Strength RLE  Strength RLE: WFL  Strength LLE  Strength LLE: WFL  Strength RUE  Strength RUE: WFL  Strength LUE  Strength LUE: WFL  Tone RLE  RLE Tone: Normotonic  Tone LLE  LLE Tone: Normotonic  Motor Control  Gross Motor?: WFL     Bed mobility  Supine to Sit: Contact guard assistance  Sit to Supine: Contact guard assistance  Scooting: Contact guard assistance  Transfers  Sit to Stand: Contact guard assistance  Stand to sit: Contact guard assistance  Ambulation  Ambulation?: Yes(Did not ambulate this date as patient was impulsive and inconsistently following commads. Decided to stay bedside for safety concerns. Stood EOB with RW and CGA x 5 min. )     Balance  Posture: Fair  Sitting - Static: Good  Sitting - Dynamic: Good  Standing - Static: Fair  Standing - Dynamic: [address]  Times per week: 3-5x/week  Current Treatment Recommendations: Strengthening, Transfer Training, Neuromuscular Re-education, Balance Training, Gait Training, Functional Mobility Training  Safety Devices  Type of devices:  All fall risk precautions in place, Bed alarm in place, Nurse notified, Patient at risk for falls, Telesitter in use, Gait belt      AM-PAC Score   15/24 57% impaired    Goals  Short term goals  Time Frame for Short term goals: 10 visits  Short term goal 1: Patient will ambulate 100 ft with RW and CGA. Short term goal 2: Patient will demonstrate good dynamic standing balance. Short term goal 3: Patient will complete sit to stand transfers with supervision. Patient Goals   Patient goals : Return to facility.        Therapy Time   Individual Concurrent Group Co-treatment   Time In 0830         Time Out 0900         Minutes [address] [facility], [address] [facility]

## 2020-01-19 NOTE — PROGRESS NOTES
[facility]  Internal Medicine Teaching Residency Program  Inpatient Daily Progress Note  ______________________________________________________________________________    Patient: Saúl Courtney  YOB: 1935   WAU:[id number]    Acct: [de-identified]     Room: 2015/2015-01  Admit date: 1/18/2020  Today's date: 01/18/20  Number of days in the hospital: 0    SUBJECTIVE   Admitting Diagnosis: Acute metabolic encephalopathy  CC: generalized weakness  Pt examined at bedside. Chart & results reviewed. Patient much more oriented today  His daughter states that he is almost back to her baseline  No other complaints overnight    ROS:  Constitutional: Positive for activity change and fatigue. Negative for appetite change, chills and fever. HENT: Negative for congestion, ear discharge, facial swelling, nosebleeds and rhinorrhea. Eyes: Negative for discharge, redness and itching. Respiratory: Negative for apnea, cough, chest tightness and shortness of breath. Cardiovascular: Positive for leg swelling. Negative for chest pain. Gastrointestinal: Negative for abdominal distention, abdominal pain, blood in stool, constipation and nausea. Endocrine: Negative for cold intolerance and heat intolerance. Genitourinary: Negative for difficulty urinating, discharge, enuresis, flank pain and frequency. Musculoskeletal: Negative for arthralgias, gait problem, joint swelling and neck pain. Skin: Negative for color change and pallor. Allergic/Immunologic: Negative for environmental allergies and food allergies. Neurological: Positive for speech difficulty and weakness. Negative for dizziness, seizures, numbness and headaches. Hematological: Negative for adenopathy. Psychiatric/Behavioral: Positive for decreased concentration. Negative for agitation and sleep disturbance.    BRIEF HISTORY     The patient is a pleasant 82 y.o. male presents with a chief complaint atraumatic. Mouth/Throat:   Mouth: Mucous membranes are moist.   Pharynx: No oropharyngeal exudate or posterior oropharyngeal erythema. Eyes:   Extraocular Movements: Extraocular movements intact. Pupils: Pupils are equal, round, and reactive to light. Neck:   Musculoskeletal: Normal range of motion and neck supple. No neck rigidity or muscular tenderness. Cardiovascular:   Rate and Rhythm: Normal rate. Pulses: Normal pulses. Heart sounds: Normal heart sounds. No murmur. No friction rub. Pulmonary:   Effort: Pulmonary effort is normal. No respiratory distress. Breath sounds: Normal breath sounds. Chest:   Chest wall: No tenderness. Abdominal:   General: Abdomen is flat. There is no distension. Palpations: Abdomen is soft. There is no mass. Musculoskeletal: Normal range of motion. General: Swelling present. No tenderness. Skin:   General: Skin is warm and dry. Coloration: Skin is not jaundiced or pale. Neurological:   Mental Status: He is disoriented. Motor: Weakness present. Psychiatric:   Comments: Unable to assess          Medications:  Scheduled Medications:    sodium chloride flush  10 mL Intravenous 2 times per day    sodium chloride flush  10 mL Intravenous 2 times per day    enoxaparin  40 mg Subcutaneous Daily     Continuous Infusions:    dextrose 5 % and 0.9 % NaCl 50 mL/hr at 01/18/20 2057     PRN Medicationssodium chloride flush, 10 mL, PRN  sodium chloride flush, 10 mL, PRN  magnesium hydroxide, 30 mL, Daily PRN  ondansetron, 4 mg, Q6H PRN  acetaminophen, 650 mg, Q4H PRN        Diagnostic Labs:  CBC:   Recent Labs     01/18/20  1105   WBC 4.3   RBC 4.92   HGB 15.1   HCT 46.7   MCV 94.9   RDW 13.4        BMP:   Recent Labs     01/18/20  1105      K 4.3      CO2 28   BUN 21   CREATININE 0.80       ASSESSMENT & PLAN   1. Acute metabolic encephalopathy due to dehydration from poor oral intake. Resolving. Continue IVF.  Encourage oral intake 2. Gait difficulties with tremors. Obtain MRI neck. Neuro following. Continue PT  3. Chronic systolic CHF. Repeat 2Decho per cardiology input  Vascular dementia-Worsening-. Check vitamin D, O84, TSH and folic acid  4. Discharge planning - back to facility    DVT ppx: Lovenox  GI ppx: None     PT/OT/SW Onboard  Discharge Planning: as per     Christina Mcnally MD  Internal Medicine Resident, PGY-1  [address] [place], [place]  1/18/2020, 11:57 PM      Attending Physician Statement  I have discussed the case, including pertinent history and exam findings with the resident and the team.  I have seen and examined the patient and the key elements of the encounter have been performed by me. I agree with the assessment, plan and orders as documented by the resident.       In Brief:  Mentation is back to baseline  Awaiting imaging to the done today  Follow up labs    Lolis Paredes MD   Attending Physician, Internal Medicine Residency Program  1/19/2020, 1:31 PM

## 2020-01-19 NOTE — PROGRESS NOTES
This is a 82 y.o. male admitted 1/18/2020 for Weakness [R53.1]. See H&P of admitting resident for more details. Assessment    Principal Problem:    Acute metabolic encephalopathy  Active Problems:    Non-ischemic cardiomyopathy (HCC)    Weakness    Vascular dementia ([address]  Resolved Problems:    * No resolved hospital problems. *        Plan     Acute metabolic encephalopathy with underlying baseline dementia asymmetric leg syndrome urology can see the patient  Differentials include dehydration, stroke,  Gentle hydration D5 and normal saline  Will consult neurology for further recommendations  Troponinemia -cardiology consulted recommend repeat troponin, no heparin drip at this time.   2015      Ramu Hill MD            Department of Internal Medicine  [facility], [place]         1/18/2020, 7:34 PM

## 2020-01-19 NOTE — CONSULTS
CAD      REVIEW OF SYSTEMS:    · Constitutional: there has been no unanticipated weight loss. There's been No change in energy level, No change in activity level. · Eyes: No visual changes or diplopia. No scleral icterus. · ENT: No Headaches  · Cardiovascular: as above  · Respiratory: No previous pulmonary problems, No cough  · Gastrointestinal: No abdominal pain. No change in bowel or bladder habits. · Genitourinary: No dysuria, trouble voiding, or hematuria. · Musculoskeletal:  No gait disturbance, No weakness or joint complaints. · Integumentary: No rash or pruritis. · Neurological: No headache, diplopia      PHYSICAL EXAM:      /60   Pulse 86   Temp 98.9 °F (37.2 °C) (Oral)   Resp 22   Ht 5' 9\" (1.753 m)   Wt 141 lb 9.6 oz (64.2 kg)   SpO2 96%   BMI 20.91 kg/m²    Constitutional and General Appearance: Alert, confused, no distress and appears stated age  HEENT: PERRLA, no cervical lymphadenopathy. No masses palpable. Respiratory:  · Resp Auscultation: Fair  respiratory effort. No increased work of breathing. · Clear to auscultation bilaterally  Cardiovascular:  · Normal S1 and S2.   · Jugular venous pulsation Normal  · Peripheral pulses are symmetrical and full   Abdomen:   · Soft  · No tenderness  · Bowel sounds present  Extremities:  · No cyanosis or clubbing  · No lower extremity edema  · Skin: Warm and dry  Neurologic:  · Alert and oriented. · Moves all extremities well  Psychiatric:  · No abnormalities of mood, affect, memory, mentation, or behavior are noted      DATA:    Diagnostics:      EKG:   No results found for this or any previous visit. ECHO:   No results found for this or any previous visit. No results found for this or any previous visit. No results found for this or any previous visit. No results found for this or any previous visit. No procedure found. No results found for this or any previous visit. No results found for this or any previous visit.   No results found for this or any previous visit. No results found for this or any previous visit. No results found for this or any previous visit. No results found for this or any previous visit. Stress Test:   No results found for this or any previous visit. Cardiac Angiography:   No results found for this or any previous visit. Labs:     CBC:   Recent Labs     01/18/20  1105 01/19/20  0528   WBC 4.3 3.8   HGB 15.1 13.9   HCT 46.7 44.2    151     BMP:   Recent Labs     01/18/20 1105 01/19/20  0528    144   K 4.3 3.8   CO2 28 25   BUN 21 14   CREATININE 0.80 0.63*   LABGLOM >60 >60   GLUCOSE 90  87 92     BNP: No results for input(s): BNP in the last 72 hours. PT/INR: No results for input(s): PROTIME, INR in the last 72 hours. APTT:No results for input(s): APTT in the last 72 hours. CARDIAC ENZYMES:  Recent Labs     01/18/20 2051 01/18/20 2321 01/19/20  0528   TROPHS 100* 98* 88*     No results for input(s): CKTOTAL, CKMB, CKMBINDEX, TROPONINI in the last 72 hours. Invalid input(s):  [address]  Recent Labs     01/18/20 2051 01/18/20 2321 01/19/20  0528   TROPONINT NOT REPORTED NOT REPORTED NOT REPORTED     FASTING LIPID PANEL:  Lab Results   Component Value Date    HDL 32 02/18/2017    TRIG 193 02/18/2017     LIVER PROFILE:  Recent Labs     01/18/20 1105 01/18/20  2321   AST 31 37   ALT 25 25   LABALBU 4.1 3.4*     Per Dr. Celso Kidd note 3/11/2015:  Atypical chest pain without signs of AMI, stress test with ischemia and EF of 39%, but cath done on 3/10/2015 showed mild CAD of mid LAD and mild LV systolic dysfunction(non ischemic cardiomyopathy)    IMPRESSION:    1. Acute encephalopathy - CT head negative, utox neg  2. Dementia  3. Troponin elevation 113>93>100>98>88. No chest pain. 4. Incomplete LBBB (new since 2015)  5. HTN  6. HLD  7. Hx of DVT  8. Hx of mild CAD  9.  Hx of CM - likely NICM, EF 39% in 2015    Patient Active Problem List   Diagnosis    Recent retinal detachment,

## 2020-01-19 NOTE — CONSULTS
Endo/heme/allergies  Negative for polydipsia, environmental allergy    Psychiatric/behavioral  Negative for suicidal ideation. Patient is not anxious        Physical Exam:   /60   Pulse 86   Temp 98.9 °F (37.2 °C) (Oral)   Resp 22   Ht 5' 9\" (1.753 m)   Wt 141 lb 9.6 oz (64.2 kg)   SpO2 96%   BMI 20.91 kg/m²   Temp (24hrs), Av.6 °F (37 °C), Min:98.2 °F (36.8 °C), Max:98.9 °F (37.2 °C)    Recent Labs     20  1104   POCGLU 86     No intake or output data in the 24 hours ending 20      NEUROLOGIC EXAMINATION  GENERAL  Appears comfortable and in no distress   HEENT  NC/ AT   NECK  Supple and no bruits heard   MENTAL STATUS:  Alert, oriented x1 not to place or time, affected , immediate, short and long term memory, no confusion, normal speech, normal language, no hallucination or delusion    CRANIAL NERVES: II     -      Visual fields intact to confrontation  III,IV,VI -  EOMs full, no afferent defect, no DIANELYS, no ptosis  V     -     Normal facial sensation  VII    -     Normal facial symmetry  VIII   -     Intact hearing  IX,X -     Symmetrical palate  XI    -     Symmetrical shoulder shrug  XII   -     Midline tongue, no atrophy    MOTOR FUNCTION:  significant for good strength of grade 5/5 upper extremity, 4 out of 5 in the lower extremities in bilateral proximal and distal muscle groups of both upper and lower extremities with normal bulk, normal tone and no involuntary movements, postural tremor, the patient has diffuse muscle spasm/twitching in the upper and lower extremities and involving the paraspinal muscles in the back.    SENSORY FUNCTION:  The patient was withdraw to pain in all extremities   CEREBELLAR FUNCTION:  Intact fine motor control over upper limbs   REFLEX FUNCTION:  Symmetric, no perverted reflex, no Babinski sign, positive Juany sign in the left upper extremity, positive palmomental reflex, positive snout and glabellar reflexes    STATION and GAIT  Not tested [facility] [I42.8] 03/11/2015       Plan:     The patient mentioned above with past medical history of vascular dementia, hypertension, coronary artery disease, nonischemic cardiomyopathy with ejection fraction around 39%, was admitted to the hospital due to altered mentation and weakness in the left side. The patient is back to baseline now. CBC, BMP, TSH within normal limits,UA was negative except for mild ketones, which may indicate dehydration,Troponin was elevated, cardiology was consulted  CT head was negative for acute changes, mild enlargement in the both lateral ventricles with Suarez ratio around 30%. DD: Vascular dementia, metabolic encephalopathy secondary to dehydration with(positive ketones in the urine)    -MRI of the brain without contrast to rule out stroke, extent of the microvascular disease  -MRI of the C-spine with and without contrast due to diffuse muscle twitching/spasms/left upper extremity positive Juany sign  -EMG as outpatient for diffuse muscular twitching, family refused any medications for that. -Ammonia level  -Vitamin B12, folate, B1 due to history of chronic alcoholism  -Start the patient on IV fluids, thiamine and folate.  -We will consider starting the patient on Aricept. -Seroquel as needed for agitation. Will follow     Follow-up further recommendations after discussing the case with attending. Consultations:   IP CONSULT TO CARDIOLOGY  IP CONSULT TO INTERNAL MEDICINE  IP CONSULT TO NEUROLOGY  IP CONSULT TO CASE MANAGEMENT     Patient is admitted as inpatient status because of co-morbidities listed above, severity of signs and symptoms as outlined, requirement for current medical therapies and most importantly because of direct risk to patient if care not provided in a hospital setting.     Delorise Lombard, MD  1/18/2020  9:23 PM    Copy sent to Dr. Kishor Webster MD

## 2020-01-20 ENCOUNTER — APPOINTMENT (OUTPATIENT)
Dept: CT IMAGING | Age: 85
DRG: 640 | End: 2020-01-20
Payer: MEDICARE

## 2020-01-20 VITALS
HEIGHT: 69 IN | SYSTOLIC BLOOD PRESSURE: 123 MMHG | WEIGHT: 141.6 LBS | HEART RATE: 96 BPM | BODY MASS INDEX: 20.97 KG/M2 | OXYGEN SATURATION: 96 % | DIASTOLIC BLOOD PRESSURE: 83 MMHG | RESPIRATION RATE: 20 BRPM | TEMPERATURE: 97.6 F

## 2020-01-20 LAB
ABSOLUTE EOS #: 0.09 K/UL (ref 0–0.44)
ABSOLUTE IMMATURE GRANULOCYTE: <0.03 K/UL (ref 0–0.3)
ABSOLUTE LYMPH #: 0.9 K/UL (ref 1.1–3.7)
ABSOLUTE MONO #: 0.61 K/UL (ref 0.1–1.2)
ANION GAP SERPL CALCULATED.3IONS-SCNC: 8 MMOL/L (ref 9–17)
BASOPHILS # BLD: 0 % (ref 0–2)
BASOPHILS ABSOLUTE: <0.03 K/UL (ref 0–0.2)
BUN BLDV-MCNC: 10 MG/DL (ref 8–23)
BUN/CREAT BLD: ABNORMAL (ref 9–20)
CALCIUM SERPL-MCNC: 8.1 MG/DL (ref 8.6–10.4)
CHLORIDE BLD-SCNC: 103 MMOL/L (ref 98–107)
CO2: 26 MMOL/L (ref 20–31)
CREAT SERPL-MCNC: 0.65 MG/DL (ref 0.7–1.2)
DIFFERENTIAL TYPE: ABNORMAL
EKG ATRIAL RATE: 69 BPM
EKG P AXIS: 58 DEGREES
EKG P-R INTERVAL: 180 MS
EKG Q-T INTERVAL: 416 MS
EKG QRS DURATION: 110 MS
EKG QTC CALCULATION (BAZETT): 445 MS
EKG R AXIS: 27 DEGREES
EKG T AXIS: 35 DEGREES
EKG VENTRICULAR RATE: 69 BPM
EOSINOPHILS RELATIVE PERCENT: 2 % (ref 1–4)
GFR AFRICAN AMERICAN: >60 ML/MIN
GFR NON-AFRICAN AMERICAN: >60 ML/MIN
GFR SERPL CREATININE-BSD FRML MDRD: ABNORMAL ML/MIN/{1.73_M2}
GFR SERPL CREATININE-BSD FRML MDRD: ABNORMAL ML/MIN/{1.73_M2}
GLUCOSE BLD-MCNC: 105 MG/DL (ref 70–99)
HCT VFR BLD CALC: 43.2 % (ref 40.7–50.3)
HEMOGLOBIN: 14 G/DL (ref 13–17)
IMMATURE GRANULOCYTES: 0 %
LYMPHOCYTES # BLD: 19 % (ref 24–43)
MCH RBC QN AUTO: 31 PG (ref 25.2–33.5)
MCHC RBC AUTO-ENTMCNC: 32.4 G/DL (ref 28.4–34.8)
MCV RBC AUTO: 95.8 FL (ref 82.6–102.9)
MONOCYTES # BLD: 13 % (ref 3–12)
NRBC AUTOMATED: 0 PER 100 WBC
PDW BLD-RTO: 13.2 % (ref 11.8–14.4)
PLATELET # BLD: 153 K/UL (ref 138–453)
PLATELET ESTIMATE: ABNORMAL
PMV BLD AUTO: 9.2 FL (ref 8.1–13.5)
POTASSIUM SERPL-SCNC: 4 MMOL/L (ref 3.7–5.3)
RBC # BLD: 4.51 M/UL (ref 4.21–5.77)
RBC # BLD: ABNORMAL 10*6/UL
SEG NEUTROPHILS: 66 % (ref 36–65)
SEGMENTED NEUTROPHILS ABSOLUTE COUNT: 3.18 K/UL (ref 1.5–8.1)
SODIUM BLD-SCNC: 137 MMOL/L (ref 135–144)
WBC # BLD: 4.8 K/UL (ref 3.5–11.3)
WBC # BLD: ABNORMAL 10*3/UL

## 2020-01-20 PROCEDURE — 1200000000 HC SEMI PRIVATE

## 2020-01-20 PROCEDURE — 85025 COMPLETE CBC W/AUTO DIFF WBC: CPT

## 2020-01-20 PROCEDURE — 99232 SBSQ HOSP IP/OBS MODERATE 35: CPT | Performed by: INTERNAL MEDICINE

## 2020-01-20 PROCEDURE — 6360000002 HC RX W HCPCS: Performed by: STUDENT IN AN ORGANIZED HEALTH CARE EDUCATION/TRAINING PROGRAM

## 2020-01-20 PROCEDURE — 95816 EEG AWAKE AND DROWSY: CPT | Performed by: PSYCHIATRY & NEUROLOGY

## 2020-01-20 PROCEDURE — 2580000003 HC RX 258: Performed by: STUDENT IN AN ORGANIZED HEALTH CARE EDUCATION/TRAINING PROGRAM

## 2020-01-20 PROCEDURE — 6370000000 HC RX 637 (ALT 250 FOR IP): Performed by: STUDENT IN AN ORGANIZED HEALTH CARE EDUCATION/TRAINING PROGRAM

## 2020-01-20 PROCEDURE — 95816 EEG AWAKE AND DROWSY: CPT

## 2020-01-20 PROCEDURE — 72125 CT NECK SPINE W/O DYE: CPT

## 2020-01-20 PROCEDURE — 36415 COLL VENOUS BLD VENIPUNCTURE: CPT

## 2020-01-20 PROCEDURE — 99233 SBSQ HOSP IP/OBS HIGH 50: CPT | Performed by: STUDENT IN AN ORGANIZED HEALTH CARE EDUCATION/TRAINING PROGRAM

## 2020-01-20 PROCEDURE — 80048 BASIC METABOLIC PNL TOTAL CA: CPT

## 2020-01-20 PROCEDURE — 72131 CT LUMBAR SPINE W/O DYE: CPT

## 2020-01-20 PROCEDURE — 93010 ELECTROCARDIOGRAM REPORT: CPT | Performed by: INTERNAL MEDICINE

## 2020-01-20 RX ORDER — IBUPROFEN 200 MG
1250 CAPSULE ORAL DAILY
Status: DISCONTINUED | OUTPATIENT
Start: 2020-01-20 | End: 2020-01-20

## 2020-01-20 RX ORDER — OMEGA-3S/DHA/EPA/FISH OIL/D3 300MG-1000
400 CAPSULE ORAL 2 TIMES DAILY
Status: DISCONTINUED | OUTPATIENT
Start: 2020-01-20 | End: 2020-01-21 | Stop reason: HOSPADM

## 2020-01-20 RX ORDER — SODIUM CHLORIDE 9 MG/ML
INJECTION, SOLUTION INTRAVENOUS CONTINUOUS
Status: DISCONTINUED | OUTPATIENT
Start: 2020-01-20 | End: 2020-01-20

## 2020-01-20 RX ORDER — CALCIUM CARBONATE 500(1250)
500 TABLET ORAL 2 TIMES DAILY
Status: DISCONTINUED | OUTPATIENT
Start: 2020-01-20 | End: 2020-01-21 | Stop reason: HOSPADM

## 2020-01-20 RX ORDER — DIPHENHYDRAMINE HCL 25 MG
25 TABLET ORAL ONCE
Status: DISCONTINUED | OUTPATIENT
Start: 2020-01-20 | End: 2020-01-21 | Stop reason: HOSPADM

## 2020-01-20 RX ORDER — MEMANTINE HYDROCHLORIDE 5 MG/1
5 TABLET ORAL NIGHTLY
Status: DISCONTINUED | OUTPATIENT
Start: 2020-01-20 | End: 2020-01-21 | Stop reason: HOSPADM

## 2020-01-20 RX ORDER — ATORVASTATIN CALCIUM 20 MG/1
20 TABLET, FILM COATED ORAL NIGHTLY
Status: DISCONTINUED | OUTPATIENT
Start: 2020-01-20 | End: 2020-01-21 | Stop reason: HOSPADM

## 2020-01-20 RX ADMIN — Medication 81 MG: at 09:20

## 2020-01-20 RX ADMIN — Medication 10 ML: at 09:20

## 2020-01-20 RX ADMIN — Medication 10 ML: at 20:10

## 2020-01-20 RX ADMIN — CHOLECALCIFEROL TAB 10 MCG (400 UNIT) 400 UNITS: 10 TAB at 20:11

## 2020-01-20 RX ADMIN — MEMANTINE HYDROCHLORIDE 5 MG: 5 TABLET, FILM COATED ORAL at 20:11

## 2020-01-20 RX ADMIN — ATORVASTATIN CALCIUM 20 MG: 20 TABLET, FILM COATED ORAL at 20:11

## 2020-01-20 RX ADMIN — CALCIUM 500 MG: 500 TABLET ORAL at 20:10

## 2020-01-20 RX ADMIN — CALCIUM 500 MG: 500 TABLET ORAL at 09:20

## 2020-01-20 RX ADMIN — CHOLECALCIFEROL TAB 10 MCG (400 UNIT) 400 UNITS: 10 TAB at 09:20

## 2020-01-20 RX ADMIN — ENOXAPARIN SODIUM 40 MG: 40 INJECTION SUBCUTANEOUS at 09:20

## 2020-01-20 NOTE — PROGRESS NOTES
Neurology Resident Progress Note      SUBJECTIVE:  This is a 82 y.o.  male admitted 1/18/2020 for Weakness [R53.1]  This is a follow-up neurology progress note. The patient was seen and examined and the chart was reviewed. There were no acute events overnight. This morning, pleasant. Slightly confused. Still complaining of lower back pain. Denied leg pain today. Afebrile. Stable vitals. Did not complete MRI spine because would not lay still. ROS  Constitutional: no fever, chills, fatigue  HENT: No change in vision or hearing   Respiratory: No cough, SOB, wheezing. Cardiovascular:  No chest pain, palpitations, leg swelling. Gastrointestinal: No nausea, vomiting, diarrhea. Genitourinary: No increased frequency, urgency. Musculoskeletal: Lower back pain   Skin: No rashes or scarring or bruises. Neurological: No headache, paresthesia, or focal weakness. Endo/Heme/Allergies: Negative for itchy eyes or runny nose. Psychiatric/Behavioral: No anxiety or depressed mood. HPI  See H&P     vitamin D3  400 Units Oral BID    calcium elemental  500 mg Oral BID    aspirin  81 mg Oral Daily    atorvastatin  40 mg Oral Nightly    sodium chloride flush  10 mL Intravenous 2 times per day    sodium chloride flush  10 mL Intravenous 2 times per day    enoxaparin  40 mg Subcutaneous Daily       Past Medical History:   Diagnosis Date    [facility]     Arthritis     Dementia ([facility]     Hx of blood clots     l arm    Hypertension     off meds now    Psoriasis        Past Surgical History:   Procedure Laterality Date    CARDIAC CATHETERIZATION  03/10/2015    CATARACT REMOVAL WITH IMPLANT Bilateral     EYE SURGERY Left 5/6/14    vitrectomy    VENA CAVA FILTER PLACEMENT      catrachita filter    VITRECTOMY Left 10/14/14       PHYSICAL EXAM:      Blood pressure 128/65, pulse 84, temperature 98.9 °F (37.2 °C), temperature source Oral, resp.  rate 23, height 5' 9\" (1.753 m), weight

## 2020-01-20 NOTE — PROGRESS NOTES
[facility]  Internal Medicine Teaching Residency Program  Inpatient Daily Progress Note  ______________________________________________________________________________    Patient: Zehra Schreiber  YOB: 1935   FAHAD:[id number]    Acct: [de-identified]     Room: 2015/2015-01  Admit date: 1/18/2020  Today's date: 01/20/20  Number of days in the hospital: 2    SUBJECTIVE   Admitting Diagnosis: Acute metabolic encephalopathy  CC: generalized weakness  Patient seen and examined today  Patient did not get MRI cervical and lumbar spine yesterday as patient moving on the MRI admission  Patient did receive Zyprexa and seroquel  for the same reason  Patient oriented x1 today        ROS:  Able to assess due to patient's current mentation   BRIEF HISTORY     The patient is a pleasant 82 y.o. male presents with a chief complaint of having generalized weakness and drowsiness since early in the morning today. Most of the history was obtained from the daughter and son in law as the patient has dementia and could not answer the questions. She says that she was called to the facility the patient is admitted to because of concern for patient not getting up from the bed in the morning. Nursing saff told the patient's daughter that he might have had some facial droop in the morning. When the daughter reached the facility she said that her father was in the chair but drooping to the left side. He was not answering any questions. She says that at baseline he is very active, he can dress himself, bathe himself, but he cannot do his own finances, drive or cook for himself. She says that dementia was diagnosed about 10 yrs ago and since then he has been slowly declining. She says that he has difficulty making new memories and has difficulty finding words and would usually make up elaborate words.  He denied any pain, but according to the daughter has been having some lower extremity I agree with the assessment, plan and orders as documented by the resident. Doing well  Mentation at baseline  Vital stable  Discussed with daughter regarding further work for trops elevation. There is not benefit considering advanced dementia.   Plan to discharge to facility if not further input from neuro and cardiology     Lolis Mathur MD   Attending Physician, Internal Medicine Residency Program  1/20/2020, 5:48 PM

## 2020-01-20 NOTE — PROGRESS NOTES
Smoking Cessation - topics covered   []  Health Risks  []  Benefits of Quitting   []  Smoking Cessation  []  Patient has no history of tobacco use per note in significant history. [x]  Patient is former smoker per note in significant history. [x]  No need for tobacco cessation education. []  Booklet given  []  Patient verbalizes understanding. []  Patient denies need for tobacco cessation education. []  Unable to meet with patient today. Will follow up as able.   Vinny Faulkner  9:04 AM

## 2020-01-21 LAB
ABSOLUTE EOS #: 0.08 K/UL (ref 0–0.44)
ABSOLUTE IMMATURE GRANULOCYTE: <0.03 K/UL (ref 0–0.3)
ABSOLUTE LYMPH #: 0.76 K/UL (ref 1.1–3.7)
ABSOLUTE MONO #: 0.55 K/UL (ref 0.1–1.2)
ANION GAP SERPL CALCULATED.3IONS-SCNC: 10 MMOL/L (ref 9–17)
BASOPHILS # BLD: 1 % (ref 0–2)
BASOPHILS ABSOLUTE: <0.03 K/UL (ref 0–0.2)
BUN BLDV-MCNC: 9 MG/DL (ref 8–23)
BUN/CREAT BLD: ABNORMAL (ref 9–20)
CALCIUM SERPL-MCNC: 9.2 MG/DL (ref 8.6–10.4)
CHLORIDE BLD-SCNC: 97 MMOL/L (ref 98–107)
CO2: 31 MMOL/L (ref 20–31)
CREAT SERPL-MCNC: 0.76 MG/DL (ref 0.7–1.2)
DIFFERENTIAL TYPE: ABNORMAL
EOSINOPHILS RELATIVE PERCENT: 2 % (ref 1–4)
GFR AFRICAN AMERICAN: >60 ML/MIN
GFR NON-AFRICAN AMERICAN: >60 ML/MIN
GFR SERPL CREATININE-BSD FRML MDRD: ABNORMAL ML/MIN/{1.73_M2}
GFR SERPL CREATININE-BSD FRML MDRD: ABNORMAL ML/MIN/{1.73_M2}
GLUCOSE BLD-MCNC: 89 MG/DL (ref 70–99)
HCT VFR BLD CALC: 46.3 % (ref 40.7–50.3)
HEMOGLOBIN: 15.1 G/DL (ref 13–17)
IMMATURE GRANULOCYTES: 0 %
LYMPHOCYTES # BLD: 18 % (ref 24–43)
MCH RBC QN AUTO: 31 PG (ref 25.2–33.5)
MCHC RBC AUTO-ENTMCNC: 32.6 G/DL (ref 28.4–34.8)
MCV RBC AUTO: 95.1 FL (ref 82.6–102.9)
MONOCYTES # BLD: 13 % (ref 3–12)
NRBC AUTOMATED: 0 PER 100 WBC
PDW BLD-RTO: 13.4 % (ref 11.8–14.4)
PLATELET # BLD: 156 K/UL (ref 138–453)
PLATELET ESTIMATE: ABNORMAL
PMV BLD AUTO: 9.5 FL (ref 8.1–13.5)
POTASSIUM SERPL-SCNC: 3.9 MMOL/L (ref 3.7–5.3)
RBC # BLD: 4.87 M/UL (ref 4.21–5.77)
RBC # BLD: ABNORMAL 10*6/UL
SEG NEUTROPHILS: 66 % (ref 36–65)
SEGMENTED NEUTROPHILS ABSOLUTE COUNT: 2.82 K/UL (ref 1.5–8.1)
SODIUM BLD-SCNC: 138 MMOL/L (ref 135–144)
WBC # BLD: 4.2 K/UL (ref 3.5–11.3)
WBC # BLD: ABNORMAL 10*3/UL

## 2020-01-21 PROCEDURE — 2580000003 HC RX 258: Performed by: STUDENT IN AN ORGANIZED HEALTH CARE EDUCATION/TRAINING PROGRAM

## 2020-01-21 PROCEDURE — 99233 SBSQ HOSP IP/OBS HIGH 50: CPT | Performed by: STUDENT IN AN ORGANIZED HEALTH CARE EDUCATION/TRAINING PROGRAM

## 2020-01-21 PROCEDURE — 6370000000 HC RX 637 (ALT 250 FOR IP): Performed by: STUDENT IN AN ORGANIZED HEALTH CARE EDUCATION/TRAINING PROGRAM

## 2020-01-21 PROCEDURE — 36415 COLL VENOUS BLD VENIPUNCTURE: CPT

## 2020-01-21 PROCEDURE — 80048 BASIC METABOLIC PNL TOTAL CA: CPT

## 2020-01-21 PROCEDURE — 99239 HOSP IP/OBS DSCHRG MGMT >30: CPT | Performed by: INTERNAL MEDICINE

## 2020-01-21 PROCEDURE — 2580000003 HC RX 258: Performed by: INTERNAL MEDICINE

## 2020-01-21 PROCEDURE — 6360000002 HC RX W HCPCS: Performed by: STUDENT IN AN ORGANIZED HEALTH CARE EDUCATION/TRAINING PROGRAM

## 2020-01-21 PROCEDURE — 85025 COMPLETE CBC W/AUTO DIFF WBC: CPT

## 2020-01-21 RX ORDER — ASPIRIN 81 MG/1
81 TABLET ORAL DAILY
Qty: 30 TABLET | Refills: 3 | Status: SHIPPED | OUTPATIENT
Start: 2020-01-22

## 2020-01-21 RX ORDER — MEMANTINE HYDROCHLORIDE 5 MG/1
5 TABLET ORAL NIGHTLY
Qty: 60 TABLET | Refills: 3 | Status: SHIPPED | OUTPATIENT
Start: 2020-01-21 | End: 2020-01-30 | Stop reason: SINTOL

## 2020-01-21 RX ORDER — ATORVASTATIN CALCIUM 20 MG/1
20 TABLET, FILM COATED ORAL NIGHTLY
Qty: 30 TABLET | Refills: 3 | Status: SHIPPED | OUTPATIENT
Start: 2020-01-21

## 2020-01-21 RX ORDER — CALCIUM CARBONATE 500(1250)
500 TABLET ORAL 2 TIMES DAILY
Qty: 30 TABLET | Refills: 3 | Status: SHIPPED | OUTPATIENT
Start: 2020-01-21 | End: 2020-01-30 | Stop reason: SINTOL

## 2020-01-21 RX ADMIN — Medication 10 ML: at 08:09

## 2020-01-21 RX ADMIN — Medication 81 MG: at 08:09

## 2020-01-21 RX ADMIN — CALCIUM 500 MG: 500 TABLET ORAL at 08:10

## 2020-01-21 RX ADMIN — ACETAMINOPHEN 650 MG: 325 TABLET ORAL at 12:42

## 2020-01-21 RX ADMIN — Medication 10 ML: at 08:11

## 2020-01-21 RX ADMIN — CHOLECALCIFEROL TAB 10 MCG (400 UNIT) 400 UNITS: 10 TAB at 08:09

## 2020-01-21 RX ADMIN — ENOXAPARIN SODIUM 40 MG: 40 INJECTION SUBCUTANEOUS at 08:09

## 2020-01-21 ASSESSMENT — PAIN SCALES - GENERAL: PAINLEVEL_OUTOF10: 6

## 2020-01-21 NOTE — PROGRESS NOTES
Physical Therapy  DATE: 2020    NAME: Kimberly Gee  MRN: [id number]   : 1935    Patient not seen this date for Physical Therapy due to:  [] Blood transfusion in progress  [] Hemodialysis  []  Patient Declined  [] Spine Precautions   [] Strict Bedrest  [] Surgery/ Procedure  [] Testing      [x] Other: Pt discharging from hospital per RN.        [] PT being discontinued at this time. Patient independent. No further needs. [] PT being discontinued at this time as the patient has been transferred to palliative care. No further needs.     Melvina Khalil, PT

## 2020-01-21 NOTE — PLAN OF CARE
Problem: SAFETY  Goal: Free from accidental physical injury  1/19/2020 2058 by Fiona Toney RN  Outcome: Ongoing  1/19/2020 0950 by Kain Walters RN  Outcome: Ongoing  Goal: Free from intentional harm  1/19/2020 2058 by Fiona Toney RN  Outcome: Ongoing  1/19/2020 0950 by Kain Walters RN  Outcome: Ongoing     Problem: DAILY CARE  Goal: Daily care needs are met  1/19/2020 2058 by Fiona Toney RN  Outcome: Ongoing  1/19/2020 0950 by Kain Walters RN  Outcome: Ongoing     Problem: PAIN  Goal: Patient's pain/discomfort is manageable  1/19/2020 2058 by Fiona Toney RN  Outcome: Ongoing  1/19/2020 0950 by Kain Walters RN  Outcome: Ongoing     Problem: SKIN INTEGRITY  Goal: Skin integrity is maintained or improved  1/19/2020 2058 by Fiona Toney RN  Outcome: Ongoing  1/19/2020 0950 by Kain Walters RN  Outcome: Ongoing     Problem: KNOWLEDGE DEFICIT  Goal: Patient/S.O. demonstrates understanding of disease process, treatment plan, medications, and discharge instructions.   1/19/2020 2058 by Fiona Toney RN  Outcome: Ongoing  1/19/2020 0950 by Kain Walters RN  Outcome: Ongoing     Problem: Falls - Risk of:  Goal: Will remain free from falls  Description  Will remain free from falls  1/19/2020 2058 by Fiona Toney RN  Outcome: Ongoing  1/19/2020 0950 by Kain Walters RN  Outcome: Ongoing  Goal: Absence of physical injury  Description  Absence of physical injury  1/19/2020 2058 by Fiona Toney RN  Outcome: Ongoing  1/19/2020 0950 by Kain Walters RN  Outcome: Ongoing
Problem: SAFETY  Goal: Free from accidental physical injury  Outcome: Ongoing  Goal: Free from intentional harm  Outcome: Ongoing     Problem: DAILY CARE  Goal: Daily care needs are met  Outcome: Ongoing     Problem: PAIN  Goal: Patient's pain/discomfort is manageable  Outcome: Ongoing     Problem: SKIN INTEGRITY  Goal: Skin integrity is maintained or improved  Outcome: Ongoing     Problem: KNOWLEDGE DEFICIT  Goal: Patient/S.O. demonstrates understanding of disease process, treatment plan, medications, and discharge instructions.   Outcome: Ongoing     Problem: Falls - Risk of:  Goal: Will remain free from falls  Description  Will remain free from falls  Outcome: Ongoing  Goal: Absence of physical injury  Description  Absence of physical injury  Outcome: Ongoing
Saad Campbell RN  Outcome: Ongoing

## 2020-01-21 NOTE — DISCHARGE INSTR - COC
2020 1135  Last data filed at 2020 0636  Gross per 24 hour   Intake 240 ml   Output --   Net 240 ml     I/O last 3 completed shifts: In: 240 [P.O.:240]  Out: -     Safety Concerns:     [address] [employer] Safety Concerns:340736603}    Impairments/Disabilities:      [address] PAUL Impairments/Disabilities:688600613}    Nutrition Therapy:  Current Nutrition Therapy:   [address] PAUL Diet List:033965373}    Routes of Feeding: {CHP DME Other Feedings:867234788}  Liquids: {Slp liquid thickness:97167}  Daily Fluid Restriction: {CHP DME Yes amt example:696202406}  Last Modified Barium Swallow with Video (Video Swallowing Test): {Done Not Done EYJZ:633926038}    Treatments at the Time of Hospital Discharge:   Respiratory Treatments: ***  Oxygen Therapy:  {Therapy; copd oxygen:57427}  Ventilator:    { CC Vent CCKU:282555496}    Rehab Therapies: {THERAPEUTIC INTERVENTION:4078825098}  Weight Bearing Status/Restrictions: [facility] Weight Bearin}  Other Medical Equipment (for information only, NOT a DME order):  {EQUIPMENT:840869955}  Other Treatments: ***    Patient's personal belongings (please select all that are sent with patient):  {[facility] DME Belongings:374438889}    RN SIGNATURE:  {Esignature:177138185}    CASE MANAGEMENT/SOCIAL WORK SECTION    Inpatient Status Date: ***    Readmission Risk Assessment Score:  Readmission Risk              Risk of Unplanned Readmission:        10           Discharging to Facility/ Agency   · Name:   · Address:  · Phone:  · Fax:    Dialysis Facility (if applicable)   · Name:  · Address:  · Dialysis Schedule:  · Phone:  · Fax:    / signature: {Esignature:830653581}    PHYSICIAN SECTION    Prognosis: Fair    Condition at Discharge: Stable    Rehab Potential (if transferring to Rehab):  Fair    Recommended Labs or Other Treatments After Discharge: None    Physician Certification: I certify the above information and transfer of Lauren Knight  is necessary for the continuing

## 2020-01-21 NOTE — PROGRESS NOTES
CLINICAL PHARMACY NOTE: MEDS TO [address] Select Patient?: Yes  Total # of Prescriptions Filled: 5   The following medications were delivered to the patient:  · ASA  · Vitamin D3  · Atorvastatin  · Calcium  · Memantine  Total # of Interventions Completed: 0  Time Spent (min): 0    Additional Documentation:  Medication delivered to daughter (POA). She did not have any questions regarding medications.     Andres Mendez  PharmD Candidate [id number]  1/21/2020 4:35 PM

## 2020-01-21 NOTE — PROGRESS NOTES
extremity swelling.      In significant past medical history, Atypical chest pain without signs of AMI, stress test with ischemia and EF of 39%, but cath done on 3/10/2015 showed mild CAD of mid LAD and mild LV systolic dysfunction(non ischemic cardiomyopathy). He had a left eye vitrectomy done in . Patient was on Warfarin due to past history of DVT, but due to risk of falls an IVC filter was placed in . 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 OBJECTIVE     Vital Signs:  /83   Pulse 96   Temp 97.6 °F (36.4 °C) (Oral)   Resp 20   Ht 5' 9\" (1.753 m)   Wt 141 lb 9.6 oz (64.2 kg)   SpO2 96%   BMI 20.91 kg/m²     Temp (24hrs), Av.6 °F (36.4 °C), Min:97.6 °F (36.4 °C), Max:97.6 °F (36.4 °C)    In: 240   Out: -     Physical Exam:  Constitutional:   Appearance: Normal appearance. He is normal weight. HENT:   Head: Normocephalic and atraumatic. Mouth/Throat:   Mouth: Mucous membranes are moist.   Pharynx: No oropharyngeal exudate or posterior oropharyngeal erythema. Eyes:   Extraocular Movements: Extraocular movements intact. Pupils: Pupils are equal, round, and reactive to light. Neck:   Musculoskeletal: Normal range of motion and neck supple. No neck rigidity or muscular tenderness. Cardiovascular:   Rate and Rhythm: Normal rate. Pulses: Normal pulses. Heart sounds: Normal heart sounds. No murmur. No friction rub. Pulmonary:   Effort: Pulmonary effort is normal. No respiratory distress. Breath sounds: Normal breath sounds. Chest:   Chest wall: No tenderness. Abdominal:   General: Abdomen is flat. There is no distension. Palpations: Abdomen is soft. There is no mass. Musculoskeletal: Normal range of motion. General: Swelling present. No tenderness. Skin:   General: Skin is warm and dry. Coloration: Skin is not jaundiced or pale. Neurological:   Mental Status: He is disoriented. Motor: Weakness present.    Psychiatric:   Comments: Unable to assess          Medications:  Scheduled Medications:    vitamin D3  400 Units Oral BID    calcium elemental  500 mg Oral BID    memantine  5 mg Oral Nightly    atorvastatin  20 mg Oral Nightly    diphenhydrAMINE  25 mg Oral Once    aspirin  81 mg Oral Daily    sodium chloride flush  10 mL Intravenous 2 times per day    sodium chloride flush  10 mL Intravenous 2 times per day    enoxaparin  40 mg Subcutaneous Daily     Continuous Infusions:     PRN Medicationssodium chloride flush, 10 mL, PRN  sodium chloride flush, 10 mL, PRN  magnesium hydroxide, 30 mL, Daily PRN  ondansetron, 4 mg, Q6H PRN  acetaminophen, 650 mg, Q4H PRN        Diagnostic Labs:  CBC:   Recent Labs     01/19/20 0528 01/20/20 0414 01/21/20  0442   WBC 3.8 4.8 4.2   RBC 4.57 4.51 4.87   HGB 13.9 14.0 15.1   HCT 44.2 43.2 46.3   MCV 96.7 95.8 95.1   RDW 13.3 13.2 13.4    153 156     BMP:   Recent Labs     01/19/20 0528 01/20/20 0414 01/21/20  0442    137 138   K 3.8 4.0 3.9   * 103 97*   CO2 25 26 31   BUN 14 10 9   CREATININE 0.63* 0.65* 0.76       ASSESSMENT & PLAN   1. Acute metabolic encephalopathy likely from dehydration with baseline underlying severe vascular dementia . Mentation waxing and waning . Encourage PO intake  2. VIT D deficiency - on Supplementations   3. Gait difficulties with tremors. MRI brain done no stroke, MRI cervical and lumbar showed chronic degenerative changes  4. Demand ischemia of the myocardium. Doubt ACS. Considering co-morbidities and benefit wit further work up, ECHO discontinued as family does not want any further work up.   5. Discharge planning - back to facility    DVT ppx: Lovenox  GI ppx: None     PT/OT/SW Onboard  Discharge Planning: as per     Kallie Ruiz MD      Department of Internal Medicine  [facility] [facility]         1/21/2020, 9:24 AM      Attending Physician Statement  I have discussed the case, including pertinent history

## 2020-01-21 NOTE — PROGRESS NOTES
JVD, trachea midline, no adenopathy  Lungs: Coarse to auscultation  Heart: Regular rate and rhythm, s1/s2 auscultated, no murmurs  Abdomen: soft, non-tender, bowel sounds active  Extremities: no edema  Neurologic: not done        Assessment / Acute Cardiac Problems:   1. Acute encephalopathy - CT head negative, utox neg  2. Dementia  3. Troponin elevation 113>93>100>98>88. No chest pain. 4. Incomplete LBBB (new since 2015)  5. HTN  6. HLD  7. Hx of DVT  8. Hx of mild CAD  9. Hx of CM - likely NICM, EF 39% in 2015    Patient Active Problem List:     Recent retinal detachment, partial, with single defect     Macular hole     Hypertension     Arthritis     Psoriasis     Anemia     Positive cardiac stress test     CAD (coronary artery disease)     Non-ischemic cardiomyopathy (HCC)     Angina pectoris ([facility]     History of angina     Fatigue     Acute metabolic encephalopathy     Vascular dementia (HCC)     Acute encephalopathy     Gait difficulty     Chronic midline low back pain with left-sided sciatica      Plan of Treatment:   1. Elevated trops. Flat. Doubt ACS, likely type II MI.    2. Long discussion with daughter. She is requesting no further work up at this time. Will d/c echo. Risks and benefits reviewed and pt states they are aware and would like to go home today. 3. Acute Metabolic encephalopathy per Primary/Neuro  4.  no further workup from cardiology standpoint.       Electronically signed by JENNA Garcia CNP on 1/21/2020 at 10:43 AM  [address]  [phone]

## 2020-01-21 NOTE — CARE COORDINATION
Called and notified Tracie at [facility] of pt discharge today. Faxed AVS to [place].

## 2020-01-21 NOTE — PROCEDURES
[address]  [facility]      ELECTROENCEPHALOGRAM REPORT        REFERRING PHYSICIAN:  Del Isaac MD  PATIENT NAME:Albert Deborra Holter  PATIENT MRN: [id number]  DATE OF EE2020    BRIEF HISTORY:  82year old male with dementia was admitted for gait abnormality, worsening cognition, EEG to evaluate. CURRENT ANTI-EPILEPTIC MEDICATIONS: None      EEG DESCRIPTION:   During maximal wakefulness, the background was continuous but in delta and theta frequency, mainly in theta frequency with posterior dominant rhythm at 5-6 Hz ranging between 10-50 uV. Spontaneous variability was present. No epileptiform discharges or seizures were recorded. No sleep patterns were noted. Photic stimulation did not activate abnormal activity, hyperventilation was not present. Heart rate was at ~60s per minute on a single lead EKG, at times very irregular. CLASSIFICATION   Abnormal significant II (Lethargy)   1. Background slow    IMPRESSION:   This was an abnormal routine EEG which showed evidence of moderate diffuse encephalopathy, no epileptiform discharges or EEG/clinical seizures were noted. Single lead EKG detected arrhythmia with heart rate at 60s.      Sai Turner MD, [address] [place] [address], Neurology  [place] Certified Epileptologist

## 2020-01-22 ENCOUNTER — TELEPHONE (OUTPATIENT)
Dept: INTERNAL MEDICINE | Age: 85
End: 2020-01-22

## 2020-01-22 NOTE — TELEPHONE ENCOUNTER
[facility] Transitions Initial Follow Up Call    Call within 2 business days of discharge: Yes     Patient: Joey Myers Patient : 1935 MRN: [id number]    [unfilled]    RARS: Readmission Risk Score: 10       Spoke with: pc to daughter Thomas Boy, no answer asking daughter to call clinic to setup appt for pt to be seen. ( Dr. Maia Chavez saw pt in [facility])    Discharge department/facility: [place] [address]    Non-face-to-face services provided:  Obtained and reviewed discharge summary and/or continuity of care documents    Follow Up  Future Appointments   Date Time Provider Alf Fox   2020 10:40 AM Oleg Iverson MD [facility] KARINA Arnold RN

## 2020-01-23 LAB — VITAMIN B1 WHOLE BLOOD: 122 NMOL/L (ref 70–180)

## 2020-01-30 ENCOUNTER — OFFICE VISIT (OUTPATIENT)
Dept: INTERNAL MEDICINE | Age: 85
End: 2020-01-30
Payer: MEDICARE

## 2020-01-30 VITALS — DIASTOLIC BLOOD PRESSURE: 80 MMHG | SYSTOLIC BLOOD PRESSURE: 116 MMHG | HEART RATE: 83 BPM

## 2020-01-30 PROCEDURE — 1123F ACP DISCUSS/DSCN MKR DOCD: CPT | Performed by: STUDENT IN AN ORGANIZED HEALTH CARE EDUCATION/TRAINING PROGRAM

## 2020-01-30 PROCEDURE — 99211 OFF/OP EST MAY X REQ PHY/QHP: CPT | Performed by: INTERNAL MEDICINE

## 2020-01-30 PROCEDURE — G8482 FLU IMMUNIZE ORDER/ADMIN: HCPCS | Performed by: STUDENT IN AN ORGANIZED HEALTH CARE EDUCATION/TRAINING PROGRAM

## 2020-01-30 PROCEDURE — 1036F TOBACCO NON-USER: CPT | Performed by: STUDENT IN AN ORGANIZED HEALTH CARE EDUCATION/TRAINING PROGRAM

## 2020-01-30 PROCEDURE — G8420 CALC BMI NORM PARAMETERS: HCPCS | Performed by: STUDENT IN AN ORGANIZED HEALTH CARE EDUCATION/TRAINING PROGRAM

## 2020-01-30 PROCEDURE — G8427 DOCREV CUR MEDS BY ELIG CLIN: HCPCS | Performed by: STUDENT IN AN ORGANIZED HEALTH CARE EDUCATION/TRAINING PROGRAM

## 2020-01-30 PROCEDURE — 1111F DSCHRG MED/CURRENT MED MERGE: CPT | Performed by: STUDENT IN AN ORGANIZED HEALTH CARE EDUCATION/TRAINING PROGRAM

## 2020-01-30 PROCEDURE — 4040F PNEUMOC VAC/ADMIN/RCVD: CPT | Performed by: STUDENT IN AN ORGANIZED HEALTH CARE EDUCATION/TRAINING PROGRAM

## 2020-01-30 PROCEDURE — 99213 OFFICE O/P EST LOW 20 MIN: CPT | Performed by: STUDENT IN AN ORGANIZED HEALTH CARE EDUCATION/TRAINING PROGRAM

## 2020-01-30 ASSESSMENT — PATIENT HEALTH QUESTIONNAIRE - PHQ9
SUM OF ALL RESPONSES TO PHQ QUESTIONS 1-9: 0
SUM OF ALL RESPONSES TO PHQ9 QUESTIONS 1 & 2: 0
2. FEELING DOWN, DEPRESSED OR HOPELESS: 0
SUM OF ALL RESPONSES TO PHQ QUESTIONS 1-9: 0
1. LITTLE INTEREST OR PLEASURE IN DOING THINGS: 0

## 2020-01-30 NOTE — PROGRESS NOTES
Visit Information    Have you changed or started any medications since your last visit including any over-the-counter medicines, vitamins, or herbal medicines? no   Are you having any side effects from any of your medications? -  no  Have you stopped taking any of your medications? Is so, why? -  no    Have you seen any other physician or provider since your last visit? No  Have you had any other diagnostic tests since your last visit? No  Have you been seen in the emergency room and/or had an admission to a hospital since we last saw you? Yes - Records Obtained  Have you had your routine dental cleaning in the past 6 months? no    Have you activated your [employer] account? If not, what are your barriers?  Yes     Patient Care Team:  Oscar Leger MD as PCP - General (Internal Medicine)  Oscar Leger MD as PCP - [facility]    Medical History Review  Past Medical, Family, and Social History reviewed and does contribute to the patient presenting condition    Health Maintenance   Topic Date Due    DTaP/Tdap/Td vaccine (1 - Tdap) 12/03/1946    Shingles Vaccine (1 of 2) 12/03/1987    Lipid screen  02/18/2018    Annual Wellness Visit (AWV)  05/29/2019    Flu vaccine  Completed    Pneumococcal 65+ years Vaccine  Completed
tablet  Take 1 tablet by mouth 2 times daily                   Medications marked \"taking\" at this time  Outpatient Medications Marked as Taking for the 1/30/20 encounter (Office Visit) with Alana Reynoso MD   Medication Sig Dispense Refill    aspirin 81 MG EC tablet Take 1 tablet by mouth daily 30 tablet 3    atorvastatin (LIPITOR) 20 MG tablet Take 1 tablet by mouth nightly 30 tablet 3    calcium elemental (OSCAL) 500 MG TABS tablet Take 1 tablet by mouth 2 times daily 30 tablet 3    vitamin D3 400 UNIT TABS tablet Take 1 tablet by mouth 2 times daily 30 tablet 0    acetaminophen (TYLENOL) 500 MG tablet Take 1 tablet by mouth 4 times daily as needed for Pain or Fever 60 tablet 0    guaiFENesin (ALTARUSSIN) 100 MG/5ML syrup Take 20 mLs by mouth 4 times daily as needed for Cough 1 Bottle 0        Medications patient taking as of now reconciled against medications ordered at time of hospital discharge: Yes    Chief Complaint   Patient presents with    Follow-Up from [facility]     pt was discharged from [facility] on 01/21/2020 for acute metabolic encephalopathy.  [facility]    Inpatient course: Discharge summary reviewed- see chart. Interval history/Current status:     Patient presenting for follow-up from hospital.  Reports during nursing home, noted patient becoming more sleepy, falling to the left side. Due to concerns of stroke, sent to [facility]. There, neurology consulted, imaging done. MRI showed no acute infarct, intracranial process. 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 Treatment unlikely to be stroke, potential preference from one side due to degenerative changes, pain resulting in lack of sleep resulting in acute delirium. Possible Alzheimer dementia component, started Namenda. Patient then discharged, return to nursing home. Reported at nursing home, problems persisted.   Additionally, Namenda resulting in diarrhea and lower

## 2020-02-08 NOTE — DISCHARGE SUMMARY
tablet Take 1 tablet by mouth 2 times daily, Disp-30 tablet, R-0Normal         CONTINUE these medications which have NOT CHANGED    Details   acetaminophen (TYLENOL) 500 MG tablet Take 1 tablet by mouth 4 times daily as needed for Pain or Fever, Disp-60 tablet, R-0Print      guaiFENesin (ALTARUSSIN) 100 MG/5ML syrup Take 20 mLs by mouth 4 times daily as needed for Cough, Disp-1 Bottle, R-0Print             Activity: activity as tolerated    Diet: regular diet    Follow-up:    Colton Del Rosario MD  [address] [facility] [address] [facility]                                                                             [phone]    In 1 week  Follow up after inpatient stay    Yvan Israel, [address] [facility]  [phone]    In 1 week  Patient started on Namenda inpatient, for dose titration      Patient Instructions: None  Follow up labs: None  Follow up imaging: None    Note that over 30 minutes was spent in preparing discharge papers, discussing discharge with patient, medication review, etc.      Deanna Long MD, MD  Internal Medicine Resident, PGY-1  [facility];  [place], [place]  2/8/2020, 3:25 PM

## 2020-02-21 ENCOUNTER — TELEPHONE (OUTPATIENT)
Dept: INTERNAL MEDICINE CLINIC | Age: 85
End: 2020-02-21

## 2020-02-21 NOTE — TELEPHONE ENCOUNTER
Received perfect serve regarding problem with referral for hospice. Face sheet for demographic profile of patient required. Called [facility] clinic and updated.      Jamari Araiza MD      Department of Internal Medicine  [facility]         2/21/2020, 4:54 PM

## 2020-04-16 ENCOUNTER — TELEPHONE (OUTPATIENT)
Dept: INTERNAL MEDICINE | Age: 85
End: 2020-04-16